# Patient Record
Sex: MALE | Race: WHITE | NOT HISPANIC OR LATINO | URBAN - METROPOLITAN AREA
[De-identification: names, ages, dates, MRNs, and addresses within clinical notes are randomized per-mention and may not be internally consistent; named-entity substitution may affect disease eponyms.]

---

## 2018-10-01 ENCOUNTER — INPATIENT (INPATIENT)
Facility: HOSPITAL | Age: 33
LOS: 1 days | Discharge: LEFT BEFORE DISCHARGE | DRG: 854 | End: 2018-10-03
Attending: STUDENT IN AN ORGANIZED HEALTH CARE EDUCATION/TRAINING PROGRAM | Admitting: STUDENT IN AN ORGANIZED HEALTH CARE EDUCATION/TRAINING PROGRAM
Payer: COMMERCIAL

## 2018-10-01 VITALS
DIASTOLIC BLOOD PRESSURE: 89 MMHG | RESPIRATION RATE: 16 BRPM | OXYGEN SATURATION: 96 % | SYSTOLIC BLOOD PRESSURE: 135 MMHG | HEART RATE: 123 BPM | TEMPERATURE: 99 F

## 2018-10-01 DIAGNOSIS — R63.8 OTHER SYMPTOMS AND SIGNS CONCERNING FOOD AND FLUID INTAKE: ICD-10-CM

## 2018-10-01 DIAGNOSIS — R00.0 TACHYCARDIA, UNSPECIFIED: ICD-10-CM

## 2018-10-01 DIAGNOSIS — F19.10 OTHER PSYCHOACTIVE SUBSTANCE ABUSE, UNCOMPLICATED: ICD-10-CM

## 2018-10-01 DIAGNOSIS — Z91.89 OTHER SPECIFIED PERSONAL RISK FACTORS, NOT ELSEWHERE CLASSIFIED: ICD-10-CM

## 2018-10-01 DIAGNOSIS — Z29.9 ENCOUNTER FOR PROPHYLACTIC MEASURES, UNSPECIFIED: ICD-10-CM

## 2018-10-01 DIAGNOSIS — F13.10 SEDATIVE, HYPNOTIC OR ANXIOLYTIC ABUSE, UNCOMPLICATED: ICD-10-CM

## 2018-10-01 DIAGNOSIS — E87.1 HYPO-OSMOLALITY AND HYPONATREMIA: ICD-10-CM

## 2018-10-01 DIAGNOSIS — L03.90 CELLULITIS, UNSPECIFIED: ICD-10-CM

## 2018-10-01 DIAGNOSIS — A41.9 SEPSIS, UNSPECIFIED ORGANISM: ICD-10-CM

## 2018-10-01 LAB
ALBUMIN SERPL ELPH-MCNC: 3.1 G/DL — LOW (ref 3.4–5)
ALP SERPL-CCNC: 70 U/L — SIGNIFICANT CHANGE UP (ref 40–120)
ALT FLD-CCNC: 32 U/L — SIGNIFICANT CHANGE UP (ref 12–42)
ANION GAP SERPL CALC-SCNC: 10 MMOL/L — SIGNIFICANT CHANGE UP (ref 9–16)
APTT BLD: 26 SEC — LOW (ref 27.5–36.5)
AST SERPL-CCNC: 56 U/L — HIGH (ref 15–37)
BASOPHILS NFR BLD AUTO: 0.1 % — SIGNIFICANT CHANGE UP (ref 0–2)
BILIRUB SERPL-MCNC: 0.7 MG/DL — SIGNIFICANT CHANGE UP (ref 0.2–1.2)
BUN SERPL-MCNC: 15 MG/DL — SIGNIFICANT CHANGE UP (ref 7–23)
CALCIUM SERPL-MCNC: 8.1 MG/DL — LOW (ref 8.5–10.5)
CHLORIDE SERPL-SCNC: 96 MMOL/L — SIGNIFICANT CHANGE UP (ref 96–108)
CO2 SERPL-SCNC: 22 MMOL/L — SIGNIFICANT CHANGE UP (ref 22–31)
CREAT SERPL-MCNC: 1.19 MG/DL — SIGNIFICANT CHANGE UP (ref 0.5–1.3)
CRP SERPL-MCNC: >12 MG/DL — HIGH (ref 0–0.9)
EOSINOPHIL NFR BLD AUTO: 0.2 % — SIGNIFICANT CHANGE UP (ref 0–6)
ERYTHROCYTE [SEDIMENTATION RATE] IN BLOOD: 42 MM/HR — HIGH (ref 0–15)
GLUCOSE SERPL-MCNC: 115 MG/DL — HIGH (ref 70–99)
HCT VFR BLD CALC: 35.5 % — LOW (ref 39–50)
HGB BLD-MCNC: 12.4 G/DL — LOW (ref 13–17)
IMM GRANULOCYTES NFR BLD AUTO: 0.5 % — SIGNIFICANT CHANGE UP (ref 0–1.5)
INR BLD: 1.42 — HIGH (ref 0.88–1.16)
LACTATE SERPL-SCNC: 1.1 MMOL/L — SIGNIFICANT CHANGE UP (ref 0.4–2)
LYMPHOCYTES # BLD AUTO: 8.5 % — LOW (ref 13–44)
MCHC RBC-ENTMCNC: 28.3 PG — SIGNIFICANT CHANGE UP (ref 27–34)
MCHC RBC-ENTMCNC: 34.9 G/DL — SIGNIFICANT CHANGE UP (ref 32–36)
MCV RBC AUTO: 81.1 FL — SIGNIFICANT CHANGE UP (ref 80–100)
MONOCYTES NFR BLD AUTO: 6.6 % — SIGNIFICANT CHANGE UP (ref 2–14)
NEUTROPHILS NFR BLD AUTO: 84.1 % — HIGH (ref 43–77)
PLATELET # BLD AUTO: 288 K/UL — SIGNIFICANT CHANGE UP (ref 150–400)
POTASSIUM SERPL-MCNC: 5 MMOL/L — SIGNIFICANT CHANGE UP (ref 3.5–5.3)
POTASSIUM SERPL-SCNC: 5 MMOL/L — SIGNIFICANT CHANGE UP (ref 3.5–5.3)
PROT SERPL-MCNC: 7.5 G/DL — SIGNIFICANT CHANGE UP (ref 6.4–8.2)
PROTHROM AB SERPL-ACNC: 15.7 SEC — HIGH (ref 9.8–12.7)
RBC # BLD: 4.38 M/UL — SIGNIFICANT CHANGE UP (ref 4.2–5.8)
RBC # FLD: 11.4 % — SIGNIFICANT CHANGE UP (ref 10.3–14.5)
SODIUM SERPL-SCNC: 128 MMOL/L — LOW (ref 132–145)
WBC # BLD: 16.6 K/UL — HIGH (ref 3.8–10.5)
WBC # FLD AUTO: 16.6 K/UL — HIGH (ref 3.8–10.5)

## 2018-10-01 PROCEDURE — 73090 X-RAY EXAM OF FOREARM: CPT | Mod: 26,RT

## 2018-10-01 PROCEDURE — 99285 EMERGENCY DEPT VISIT HI MDM: CPT

## 2018-10-01 PROCEDURE — 73201 CT UPPER EXTREMITY W/DYE: CPT | Mod: 26,RT

## 2018-10-01 PROCEDURE — 99223 1ST HOSP IP/OBS HIGH 75: CPT | Mod: GC

## 2018-10-01 RX ORDER — LIDOCAINE HCL 20 MG/ML
10 VIAL (ML) INJECTION ONCE
Qty: 0 | Refills: 0 | Status: COMPLETED | OUTPATIENT
Start: 2018-10-01 | End: 2018-10-01

## 2018-10-01 RX ORDER — OXYCODONE AND ACETAMINOPHEN 5; 325 MG/1; MG/1
1 TABLET ORAL EVERY 6 HOURS
Qty: 0 | Refills: 0 | Status: DISCONTINUED | OUTPATIENT
Start: 2018-10-01 | End: 2018-10-02

## 2018-10-01 RX ORDER — VANCOMYCIN HCL 1 G
VIAL (EA) INTRAVENOUS
Qty: 0 | Refills: 0 | Status: DISCONTINUED | OUTPATIENT
Start: 2018-10-01 | End: 2018-10-01

## 2018-10-01 RX ORDER — PIPERACILLIN AND TAZOBACTAM 4; .5 G/20ML; G/20ML
3.38 INJECTION, POWDER, LYOPHILIZED, FOR SOLUTION INTRAVENOUS ONCE
Qty: 0 | Refills: 0 | Status: COMPLETED | OUTPATIENT
Start: 2018-10-01 | End: 2018-10-01

## 2018-10-01 RX ORDER — ACETAMINOPHEN 500 MG
650 TABLET ORAL EVERY 6 HOURS
Qty: 0 | Refills: 0 | Status: DISCONTINUED | OUTPATIENT
Start: 2018-10-01 | End: 2018-10-03

## 2018-10-01 RX ORDER — HYDROMORPHONE HYDROCHLORIDE 2 MG/ML
1 INJECTION INTRAMUSCULAR; INTRAVENOUS; SUBCUTANEOUS ONCE
Qty: 0 | Refills: 0 | Status: DISCONTINUED | OUTPATIENT
Start: 2018-10-01 | End: 2018-10-01

## 2018-10-01 RX ORDER — KETOROLAC TROMETHAMINE 30 MG/ML
30 SYRINGE (ML) INJECTION ONCE
Qty: 0 | Refills: 0 | Status: DISCONTINUED | OUTPATIENT
Start: 2018-10-01 | End: 2018-10-01

## 2018-10-01 RX ORDER — SODIUM CHLORIDE 9 MG/ML
1000 INJECTION INTRAMUSCULAR; INTRAVENOUS; SUBCUTANEOUS ONCE
Qty: 0 | Refills: 0 | Status: COMPLETED | OUTPATIENT
Start: 2018-10-01 | End: 2018-10-01

## 2018-10-01 RX ORDER — SODIUM CHLORIDE 9 MG/ML
3 INJECTION INTRAMUSCULAR; INTRAVENOUS; SUBCUTANEOUS ONCE
Qty: 0 | Refills: 0 | Status: COMPLETED | OUTPATIENT
Start: 2018-10-01 | End: 2018-10-01

## 2018-10-01 RX ORDER — VANCOMYCIN HCL 1 G
1000 VIAL (EA) INTRAVENOUS EVERY 8 HOURS
Qty: 0 | Refills: 0 | Status: DISCONTINUED | OUTPATIENT
Start: 2018-10-01 | End: 2018-10-03

## 2018-10-01 RX ORDER — SODIUM CHLORIDE 9 MG/ML
2750 INJECTION INTRAMUSCULAR; INTRAVENOUS; SUBCUTANEOUS ONCE
Qty: 0 | Refills: 0 | Status: COMPLETED | OUTPATIENT
Start: 2018-10-01 | End: 2018-10-01

## 2018-10-01 RX ORDER — VANCOMYCIN HCL 1 G
1250 VIAL (EA) INTRAVENOUS ONCE
Qty: 0 | Refills: 0 | Status: COMPLETED | OUTPATIENT
Start: 2018-10-01 | End: 2018-10-01

## 2018-10-01 RX ADMIN — HYDROMORPHONE HYDROCHLORIDE 1 MILLIGRAM(S): 2 INJECTION INTRAMUSCULAR; INTRAVENOUS; SUBCUTANEOUS at 19:20

## 2018-10-01 RX ADMIN — SODIUM CHLORIDE 2750 MILLILITER(S): 9 INJECTION INTRAMUSCULAR; INTRAVENOUS; SUBCUTANEOUS at 13:22

## 2018-10-01 RX ADMIN — PIPERACILLIN AND TAZOBACTAM 200 GRAM(S): 4; .5 INJECTION, POWDER, LYOPHILIZED, FOR SOLUTION INTRAVENOUS at 09:33

## 2018-10-01 RX ADMIN — Medication 166.67 MILLIGRAM(S): at 10:45

## 2018-10-01 RX ADMIN — SODIUM CHLORIDE 1000 MILLILITER(S): 9 INJECTION INTRAMUSCULAR; INTRAVENOUS; SUBCUTANEOUS at 13:22

## 2018-10-01 RX ADMIN — Medication 30 MILLIGRAM(S): at 11:26

## 2018-10-01 RX ADMIN — SODIUM CHLORIDE 1000 MILLILITER(S): 9 INJECTION INTRAMUSCULAR; INTRAVENOUS; SUBCUTANEOUS at 09:21

## 2018-10-01 RX ADMIN — SODIUM CHLORIDE 1375 MILLILITER(S): 9 INJECTION INTRAMUSCULAR; INTRAVENOUS; SUBCUTANEOUS at 09:22

## 2018-10-01 RX ADMIN — SODIUM CHLORIDE 3 MILLILITER(S): 9 INJECTION INTRAMUSCULAR; INTRAVENOUS; SUBCUTANEOUS at 10:45

## 2018-10-01 RX ADMIN — Medication 1250 MILLIGRAM(S): at 10:30

## 2018-10-01 RX ADMIN — Medication 10 MILLILITER(S): at 13:21

## 2018-10-01 RX ADMIN — HYDROMORPHONE HYDROCHLORIDE 1 MILLIGRAM(S): 2 INJECTION INTRAMUSCULAR; INTRAVENOUS; SUBCUTANEOUS at 11:26

## 2018-10-01 RX ADMIN — PIPERACILLIN AND TAZOBACTAM 3.38 GRAM(S): 4; .5 INJECTION, POWDER, LYOPHILIZED, FOR SOLUTION INTRAVENOUS at 10:30

## 2018-10-01 RX ADMIN — HYDROMORPHONE HYDROCHLORIDE 1 MILLIGRAM(S): 2 INJECTION INTRAMUSCULAR; INTRAVENOUS; SUBCUTANEOUS at 13:21

## 2018-10-01 RX ADMIN — Medication 250 MILLIGRAM(S): at 23:13

## 2018-10-01 RX ADMIN — Medication 30 MILLIGRAM(S): at 13:21

## 2018-10-01 NOTE — H&P ADULT - ASSESSMENT
33M with a history of IVDA (heroin, cocaine) admitted with sepsis 2/2 R forearm abscess + cellulitis

## 2018-10-01 NOTE — H&P ADULT - NSHPLABSRESULTS_GEN_ALL_CORE
12.4   16.6  )-----------( 288      ( 01 Oct 2018 09:07 )             35.5       LIVER FUNCTIONS - ( 01 Oct 2018 09:07 )  Alb: 3.1 g/dL / Pro: 7.5 g/dL / ALK PHOS: 70 U/L / ALT: 32 U/L / AST: 56 U/L / GGT: x             All imaging reviewed

## 2018-10-01 NOTE — H&P ADULT - PROBLEM SELECTOR PLAN 6
F: none  E: replete PRN  N: regular diet -f/u utox and etoh  -consider methadone, clonidine for mgmt of opiate withdrawal  -monitor closely for etoh w/d  -HIV, HCV screens  -TTE   -SW c/s -advised cessation   -f/u utox and etoh  -consider methadone, clonidine for mgmt of opiate withdrawal  -monitor closely for BZD w/d  -HIV, HCV screens  -TTE   -SW c/s

## 2018-10-01 NOTE — H&P ADULT - ATTENDING COMMENTS
patient seen and examined; pt w/ right forearm abscess s/p I&D along w/ cellulitis in setting of IV drug use; pt reports taking klonopin bought off street, does not know the dose, uses 8 tabs daily for the last 30 days     reviewed vs, relevant data points: labs, available radiological reports/ studies     agree w/ PE findings as above, in addition pt had CIWA of 5 w/ mild anxiety, tongue fasciculations, mild hand tremors b/l, diaphoresis    1. sepsis; cellulitis/ abscess: on IV vancomycin, follow up ctxs; monitor for worsening signs, new cellulitis on other extremities; ordered ECHO   2. monitor for opioid withdrawl, on librium given klonopin use, monitor for BZD withdrawl; monitor for cocaine withdrawl         rest of plan as above

## 2018-10-01 NOTE — H&P ADULT - PROBLEM SELECTOR PLAN 5
-f/u utox and etoh  -consider methadone, clonidine for mgmt of opiate withdrawal  -monitor closely for etoh w/d  -HIV, HCV screens  -TTE   -SW c/s -Resolved.  initially. 2/2 sepsis +/- cocaine. f/u utox. mgmt of sepsis as above (#1).  -no e/o PE or thyrotoxicosis

## 2018-10-01 NOTE — ED ADULT NURSE NOTE - NS ED NURSE REPORT GIVEN TO FT
Agnes Saravia   2/6/2017 8:45 AM   Anticoagulation Therapy Visit    Description:  71 year old female   Provider:   ANTICOAGULATION CLINIC   Department:   Nurse           INR as of 2/6/2017     Selected INR 1.6! (2/6/2017)      Anticoagulation Summary as of 2/6/2017     INR goal 2.0-3.0   Selected INR 1.6! (2/6/2017)   Full instructions 2/6: 5 mg; 2/7: 4 mg; Otherwise 4 mg on Wed, Sat; 3 mg all other days   Next INR check 2/9/2017    Indications   Long-term (current) use of anticoagulants [Z79.01] [Z79.01]  Pulmonary embolism (H) (Resolved) [I26.99]         Your next Anticoagulation Clinic appointment(s)     Feb 09, 2017  8:45 AM   Anticoagulation Visit with  ANTICOAGULATION CLINIC   [facility] [place] ([facility])    [address]  [place] [address]   [phone]              Contact Numbers     Clinic Number:         February 2017 Details    Sun Mon Tue Wed Thu Fri Sat        1               2               3               4                 5               6      5 mg   See details      7      4 mg         8      4 mg         9            10               11                 12               13               14               15               16               17               18                 19               20               21               22               23               24               25                 26               27               28                    Date Details   02/06 This INR check       Date of next INR:  2/9/2017         How to take your warfarin dose     To take:  3 mg Take 3 of the 1 mg tablets.    To take:  4 mg Take 4 of the 1 mg tablets.    To take:  5 mg Take 5 of the 1 mg tablets.            Megan RN 4 [address]

## 2018-10-01 NOTE — ED PROVIDER NOTE - NSFOLLOWUPINSTRUCTIONS_ED_ALL_ED_FT
Follow up with spine, Dr. Chang at[address] [place] [address] 5th floor.  Call for follow up appointment

## 2018-10-01 NOTE — ED PROVIDER NOTE - MEDICAL DECISION MAKING DETAILS
34 y/o M presents to ED with cellulitis to bilat arms, abscess to R hand and forearm and R medial ankle.  + cellulitis with abscess.  Pt triggered SIRS criteria upon arrival with temp and tachycardia 120's.  + elevatd WBC.  normal lactate.  Abscess drained by Cipollone.  Pt admitted to [facility] medicine.

## 2018-10-01 NOTE — H&P ADULT - HISTORY OF PRESENT ILLNESS
Pt is a 33M with a history of IVDA (heroin, cocaine) who presents with R arm pain and swelling.    In the ED, T 99.1, , /89, RR 16, 96% on RA. He spiked a fever of 102.3 (rectally) which defervesced. HR improved to 60s-80s with IVF. CBC showed WBC 16.6 (neutrophil predominant), normal lactate, Na+ 128, INR 1.42, CRP >12, ESR 42. XR forearm revealed no foreign bodies. CT R forearm showed a 3.8x3x1.3 cm abscess in the lateral R forearm with extensive cellulitis but no e/o osteo. Plastic surgery performed beside I&D. He received vanc, zosyn, dilaudid (1 mg IV x 2), toradol 30 IVP and NS bolus 3.75L. Pt is a 33M with a history of IVDA (heroin, cocaine) and klonopin abuse who presents with R arm pain and swelling x several days. He began using IV heroin/cocaine again 3 weeks ago after a long period of abstinence (maintained on suboxone). Noticed redness/pain/swelling along bilateral forearms at sites of injections. R arm more painful than L. Most painful site was distal lateral R forearm. Pain is sharp and sore. No joint pain or swelling, but does report difficulty in moving wrist and fingers in R hand (limited 2/2 stiffness, not pain). No numbness/tingling in hands. Questionable chills. No hx MRSA or IE. Had cellulitis of a foot (doesnt know laterality) three years ago and was treated with IV abx for 2-3 days with resolution. Denies CP, palp, HA, neck stiffness, sore throat, cough, SOB, abd pain, N/V/D/C, dysuria, edema. Says he uses $400 worth of drugs daily. Cannot identify trigger for relapse. Uses ~8 klonopin daily. He has gone through detox for benzo w/d, but never req ICU or intubation. No hx seizures or DTs. Denies etoh use. Denies nasal use of drugs. Last drug use 12 hours ago. Denies anx/dep/SI/HI/AH/VH. Otherwise, ROS negative.     In the ED, T 99.1, , /89, RR 16, 96% on RA. He spiked a fever of 102.3 (rectally) which defervesced. HR improved to 60s-80s with IVF. CBC showed WBC 16.6 (neutrophil predominant), normal lactate, Na+ 128, INR 1.42, CRP >12, ESR 42. XR forearm revealed no foreign bodies. CT R forearm showed a 3.8x3x1.3 cm abscess in the lateral R forearm with extensive cellulitis but no e/o osteo. Plastic surgery performed beside I&D. He received vanc, zosyn, dilaudid (1 mg IV x 2), toradol 30 IVP and NS bolus 3.75L.    No Family hx of CAD in father.

## 2018-10-01 NOTE — H&P ADULT - PROBLEM SELECTOR PLAN 7
DVT: No pharmacologic DVT ppx indicated. Encourage ambulation.  GI: no ppx  Full code  RMF F: none  E: replete PRN  N: regular diet

## 2018-10-01 NOTE — ED PROVIDER NOTE - CONDUCTED A DETAILED DISCUSSION WITH PATIENT AND/OR GUARDIAN REGARDING, MDM
return to ED if symptoms worsen, persist or questions arise/radiology results/lab results/need to admit

## 2018-10-01 NOTE — H&P ADULT - PROBLEM SELECTOR PLAN 9
1) PCP Contacted On Admission: (Yes)    Name, Phone #  2) Date of Contact with PCP:  3) PCP Contacted at Discharge: (Y/N)  4) Summary of Handoff Given to PCP:   5) Post Discharge Appointment Date and Location: 1) PCP Contacted On Admission: (NO)    Name, Phone # NO PCP   2) Date of Contact with PCP:  3) PCP Contacted at Discharge: (Y/N)  4) Summary of Handoff Given to PCP:   5) Post Discharge Appointment Date and Location:

## 2018-10-01 NOTE — ED PROVIDER NOTE - CARE PROVIDER_API CALL
Merced Chang), Orthopaedic Surgery  [address]  [address]th [place] [address]  Phone: [phone]  Fax: [phone]

## 2018-10-01 NOTE — H&P ADULT - NSHPPHYSICALEXAM_GEN_ALL_CORE
General:  NAD, nontoxic appearing, overweight  HENT:  EOMI, PERRL.  No sinus tenderness.  oropharynx WNL.  MMM  Neck:  Trachea midline.  No JVD, LAD, or thyromegaly.  Heart:  S1S2 no M/R/G, rrr  Lungs:  CTAB no wheezing, rhonchi or rales.  No accessory muscle use.  No respiratory distress.  Abdomen:  NABS.  soft, nontender, nondistended.  no guarding.  no ascites.  no organomegaly.  Vascular:  Peripheral pulses palpable  RUE: erythema/warmth extending from dorsal wrist to proximal forearm, noncircumferential, primarily dorsal. Dressing CDI. full PROM of all joints. AROM limited in wrist and fingers 2/2 stiffness. Cap refill WNL. Senstion intact throughout. No fluctuance. Minimal drainage from site of I&D. No joint swelling.  LUE: patchy erythema/warmth and tenderness along forarm from elbow to wrist. Multiple small abrasions. No palpable fluid collections. no fluctuance/crepitus. Full AROM in all joints. Cap refill WNL. No joint swelling.   LLE: WNL  RLE: ~[id number] area of erthema above medial malleolus. No palpable fluid collection of crepitus/fluctuance. No drainage/wound. Mild ttp. No joint swelling. full ROM in ankle. Distal pulses 2+. Sensation intact throughout.   Back:  No CVA tenderness  Neuro:  AOx3, no facial asymmetry, nonfocal, no slurred speech  Psych: flat affect, slightly diaphoretic, no tremor. Normal responses.   Skin:  No rash General:  NAD, nontoxic appearing, overweight  HENT:  EOMI, PERRL.  No sinus tenderness.  oropharynx WNL.  MMM  Neck:  Trachea midline.  No JVD, LAD, or thyromegaly.  Heart:  S1S2 no M/R/G, rrr  Lungs:  CTAB no wheezing, rhonchi or rales.  No accessory muscle use.  No respiratory distress.  Abdomen:  NABS.  soft, nontender, nondistended.  no guarding.  no ascites.  no organomegaly.  Vascular:  Peripheral pulses palpable  RUE: erythema/warmth extending from dorsal wrist to proximal forearm, noncircumferential, primarily dorsal. Dressing CDI. full PROM of all joints. AROM limited in wrist and fingers 2/2 stiffness. Cap refill WNL. Sensation intact throughout. No fluctuance. Minimal drainage from site of I&D. No joint swelling.  LUE: patchy erythema/warmth and tenderness along forearm from elbow to wrist. Multiple small abrasions. No palpable fluid collections. no fluctuance/crepitus. Full AROM in all joints. Cap refill WNL. No joint swelling.   LLE: WNL  RLE: ~46hva4cv area of erthema above medial malleolus. No palpable fluid collection of crepitus/fluctuance. No drainage/wound. Mild ttp. No joint swelling. full ROM in ankle. Distal pulses 2+. Sensation intact throughout.   Back:  No CVA tenderness  Neuro:  AOx3, no facial asymmetry, nonfocal, no slurred speech  Psych: flat affect, slightly diaphoretic, no tremor. Normal responses.   Skin:  No rash

## 2018-10-01 NOTE — H&P ADULT - PROBLEM SELECTOR PLAN 4
-Resolved.  initially. 2/2 sepsis +/- cocaine. f/u utox. mgmt of sepsis as above (#1).  -no e/o PE or thyrotoxicosis -Na+ 128. Unclear etiology. f/u urine lytes.  -Repeat BMP (goal correction- max 6 meq over 24 hours

## 2018-10-01 NOTE — CONSULT NOTE ADULT - SUBJECTIVE AND OBJECTIVE BOX
32 yo R hand dominant M h/o IV drug use with recent relapse after not using drugs for ten years.  Patient states that he has been injecting heroin and cocaine into his B upper extremities over the last 3 days and has developed RUE erythema and edema since that time. HIV/Hep B negative per patient. Febrile 102F, normotensive. WBC 16K. CT scan RUE reviewed with attending radiologist: + superficial distal forearm abscess over radial aspect. No other collections. Patient given vanc/zosyn in ER and endorses decreased erythema and pain.    PMH: IVDU   All: NKDA     PE: R UE with improving cellulitis relative to the ink markings over the proximal aspect of R arm;   no crepitus; +fluctuant area over R distal forearm radially; R dorsal hand with 2X2 cm draining collection; R dorso-ulnar aspect wrist with 1X1 cm draining abscess;  NV intact; +circumferential edema/erythema with soft compartments  L UE with injection marks, cellulitis and no guillaume collections.    CT scan: as above; localized, superficial collection over radial forearm    Procedure:   1. Informed consent was obtained. R/B/A discussed with patient who expressed understanding and opted to proceed. He understands that he may proceed to further surgical intervention.  2. Skin prepped with betadine and 3 field blocks with 1% lidocaine with epinephrine were performed over the R mid-radial forearm, proximal R hand and proximal to the R ulnar styloid.  3. I+D performed R radial, mid-distal forearm with 15 blade and opened to subcutaneous tissue. +++purulent drainage expressed and sent for culture.  4. I+D performed R proximal hand with 15 blade and opened to subcutaneous tissue. Minimal purulent drainage.  5. I+D performed R dorso-ulnar distal forearm and opened to subcutaneous tissue. No drainage expressed.  6. All 3 wounds irrigated with copious amounts of sterile saline. Wounds hemostatic post-procedure and packed with 1/4" packing strip and sterile guaze.  7. Patient tolerated procedure well.    A+P: R forearm abscess s/p I+D; +forearm cellulitis B    1. s/p I+D R forearm abscess. Rec: local wound care with plain 1/4" packing strip Q 4-6 hours. Warm soaks R hand Q4 hours with saline/peroxide X 20 min/soak.  Strict elevation R UE at all times.  2. ID consult  3. Admit to medicine for IV antibiotics; patient last used heroin today  4. Appreciate consult. Please call [phone] if patient condition worsens.  5. Discussed with ED team at .

## 2018-10-01 NOTE — ED PROVIDER NOTE - SKIN TEMP
marked erythema with multiple abscess to R hand and R lower arm excluding digits and proximal 1/2 of upper arm.  + serous and exudative drainage./warm marked erythema with multiple abscess to R hand and R lower arm excluding digits and proximal 1/2 of upper arm.  + serous and exudative drainage.  L arm is also erythematous to elbow.  9 cm x 10 cm erythema to medial ankle.  no crepitus or areas of necrosis./warm

## 2018-10-01 NOTE — H&P ADULT - PROBLEM SELECTOR PLAN 1
-Presenting with fever, tachycardia and leukocytosis. Lactate WNL. Source: abscess + cellulitis, s/p bedside I&D. VSS now after abx and IVF. Adequately fluid resuscitated.  -F/u blood cx, abscess cx  -Tylenol PRN fever  -c/w vanc  -TTE

## 2018-10-01 NOTE — ED ADULT NURSE NOTE - NSIMPLEMENTINTERV_GEN_ALL_ED
Implemented All Fall Risk Interventions:  [place] to call system. Call bell, personal items and telephone within reach. Instruct patient to call for assistance. Room bathroom lighting operational. Non-slip footwear when patient is off stretcher. Physically safe environment: no spills, clutter or unnecessary equipment. Stretcher in lowest position, wheels locked, appropriate side rails in place. Provide visual cue, wrist band, yellow gown, etc. Monitor gait and stability. Monitor for mental status changes and reorient to person, place, and time. Review medications for side effects contributing to fall risk. Reinforce activity limits and safety measures with patient and family.

## 2018-10-01 NOTE — ED PROVIDER NOTE - PROGRESS NOTE DETAILS
Pt discussed with hand on call.  She will see pt in ED for bs I&D.  CT arm requested. Pt seen by Dr. Miesha Pena, hand on call.  BS I&D done.  Pt now able to be admitted for cellulitis for further management of cellulitis.  [facility] transfer center called for admission, medicine.  Awaiting call back.

## 2018-10-01 NOTE — H&P ADULT - PROBLEM SELECTOR PLAN 3
-Na+ 128. Unclear etiology. f/u urine lytes.  -Repeat BMP (goal correction- max 6 meq over 24 hours -Reports taking eight Klonopin daily x 3 weeks. Unknown dose. Last used 12 hours ago. No hx seizure or intubations, but multiple detox for benzo w/d  -Current CIWA: 3. Will monitor CIWA throughout night and use CIWA protocol. Will consult ICU for any CIWA >14 or for uncontrolled sx.  -c/w librium -Reports taking eight Klonopin daily x 3 weeks. Unknown dose. Last used 12 hours ago. No hx seizure or intubations, but multiple detox for benzo w/d  -Current CIWA: 3. Will monitor CIWA throughout night and use CIWA protocol. Will consult ICU for any CIWA >14 or for uncontrolled sx.  -c/w librium 50Q8  -ativan 2mg IVP Q2H PRN CIWA>7

## 2018-10-01 NOTE — ED PROVIDER NOTE - OBJECTIVE STATEMENT
32 y/o M, denies PMH presents to ED with cellulitis.  Pt admits he relapsed on IV heroin/cocaine use after 3 years of sobriety.  Pt noted redness and swelling to R hand 2-3 days ago.  The redness now extends to his entire R arm.  He denies fevers/chills, n/v/d.  Pt denies history of MRSA. Pt lives at home with wife.

## 2018-10-01 NOTE — H&P ADULT - NSHPSOCIALHISTORY_GEN_ALL_CORE
tobacco-   etoh-   drugs-  occupation-  lives with wife  ambulatory without assistance tobacco- denies  etoh- denies  drugs- heroin, cocaine, klonopin  occupation- "patient visitor at psychiatric facilities"  lives with wife  ambulatory without assistance

## 2018-10-01 NOTE — H&P ADULT - FAMILY HISTORY
No pertinent family history in first degree relatives Father  Still living? Unknown  No family history of cardiovascular disease, Age at diagnosis: Age Unknown     Mother  Still living? Unknown  No family history of cardiovascular disease, Age at diagnosis: Age Unknown

## 2018-10-01 NOTE — H&P ADULT - PROBLEM SELECTOR PLAN 2
-s/p bedside I&D R forearm abscess. No radiologic e/o osteo.  -C/w vanc to cover for MRSA   -f/u blood and abscess cx  -warm compresses  -Tylenol PRN fever  -tylenol for moderate pain, percocet for severe pain  -TTE

## 2018-10-02 VITALS
TEMPERATURE: 98 F | OXYGEN SATURATION: 97 % | RESPIRATION RATE: 20 BRPM | SYSTOLIC BLOOD PRESSURE: 119 MMHG | HEART RATE: 75 BPM | DIASTOLIC BLOOD PRESSURE: 76 MMHG

## 2018-10-02 LAB
ALBUMIN SERPL ELPH-MCNC: 3.2 G/DL — LOW (ref 3.3–5)
ALP SERPL-CCNC: 57 U/L — SIGNIFICANT CHANGE UP (ref 40–120)
ALT FLD-CCNC: 27 U/L — SIGNIFICANT CHANGE UP (ref 10–45)
ANION GAP SERPL CALC-SCNC: 12 MMOL/L — SIGNIFICANT CHANGE UP (ref 5–17)
AST SERPL-CCNC: 26 U/L — SIGNIFICANT CHANGE UP (ref 10–40)
BILIRUB SERPL-MCNC: 0.4 MG/DL — SIGNIFICANT CHANGE UP (ref 0.2–1.2)
BUN SERPL-MCNC: 7 MG/DL — SIGNIFICANT CHANGE UP (ref 7–23)
CALCIUM SERPL-MCNC: 8.4 MG/DL — SIGNIFICANT CHANGE UP (ref 8.4–10.5)
CHLORIDE SERPL-SCNC: 100 MMOL/L — SIGNIFICANT CHANGE UP (ref 96–108)
CO2 SERPL-SCNC: 25 MMOL/L — SIGNIFICANT CHANGE UP (ref 22–31)
CREAT SERPL-MCNC: 0.99 MG/DL — SIGNIFICANT CHANGE UP (ref 0.5–1.3)
ETHANOL SERPL-MCNC: <10 MG/DL — SIGNIFICANT CHANGE UP (ref 0–10)
GLUCOSE SERPL-MCNC: 125 MG/DL — HIGH (ref 70–99)
HBA1C BLD-MCNC: 5.2 % — SIGNIFICANT CHANGE UP (ref 4–5.6)
HCT VFR BLD CALC: 31.4 % — LOW (ref 39–50)
HCV AB S/CO SERPL IA: 0.05 S/CO — SIGNIFICANT CHANGE UP
HCV AB SERPL-IMP: SIGNIFICANT CHANGE UP
HGB BLD-MCNC: 10.5 G/DL — LOW (ref 13–17)
HIV 1+2 AB+HIV1 P24 AG SERPL QL IA: SIGNIFICANT CHANGE UP
INR BLD: 1.44 — HIGH (ref 0.88–1.16)
MAGNESIUM SERPL-MCNC: 2.5 MG/DL — SIGNIFICANT CHANGE UP (ref 1.6–2.6)
MCHC RBC-ENTMCNC: 27.5 PG — SIGNIFICANT CHANGE UP (ref 27–34)
MCHC RBC-ENTMCNC: 33.4 G/DL — SIGNIFICANT CHANGE UP (ref 32–36)
MCV RBC AUTO: 82.2 FL — SIGNIFICANT CHANGE UP (ref 80–100)
OSMOLALITY UR: 156 MOSMOL/KG — SIGNIFICANT CHANGE UP (ref 100–650)
PCP SPEC-MCNC: SIGNIFICANT CHANGE UP
PLATELET # BLD AUTO: 216 K/UL — SIGNIFICANT CHANGE UP (ref 150–400)
POTASSIUM SERPL-MCNC: 3.4 MMOL/L — LOW (ref 3.5–5.3)
POTASSIUM SERPL-SCNC: 3.4 MMOL/L — LOW (ref 3.5–5.3)
PROT SERPL-MCNC: 6.4 G/DL — SIGNIFICANT CHANGE UP (ref 6–8.3)
PROTHROM AB SERPL-ACNC: 16.1 SEC — HIGH (ref 9.8–12.7)
RBC # BLD: 3.82 M/UL — LOW (ref 4.2–5.8)
RBC # FLD: 11.8 % — SIGNIFICANT CHANGE UP (ref 10.3–16.9)
SODIUM SERPL-SCNC: 137 MMOL/L — SIGNIFICANT CHANGE UP (ref 135–145)
SODIUM UR-SCNC: <20 MMOL/L — SIGNIFICANT CHANGE UP
TSH SERPL-MCNC: 0.34 UIU/ML — LOW (ref 0.35–4.94)
WBC # BLD: 9.5 K/UL — SIGNIFICANT CHANGE UP (ref 3.8–10.5)
WBC # FLD AUTO: 9.5 K/UL — SIGNIFICANT CHANGE UP (ref 3.8–10.5)

## 2018-10-02 PROCEDURE — 83935 ASSAY OF URINE OSMOLALITY: CPT

## 2018-10-02 PROCEDURE — 84443 ASSAY THYROID STIM HORMONE: CPT

## 2018-10-02 PROCEDURE — 85610 PROTHROMBIN TIME: CPT

## 2018-10-02 PROCEDURE — 84300 ASSAY OF URINE SODIUM: CPT

## 2018-10-02 PROCEDURE — 87070 CULTURE OTHR SPECIMN AEROBIC: CPT

## 2018-10-02 PROCEDURE — 80053 COMPREHEN METABOLIC PANEL: CPT

## 2018-10-02 PROCEDURE — 99233 SBSQ HOSP IP/OBS HIGH 50: CPT | Mod: GC

## 2018-10-02 PROCEDURE — 83036 HEMOGLOBIN GLYCOSYLATED A1C: CPT

## 2018-10-02 PROCEDURE — 36415 COLL VENOUS BLD VENIPUNCTURE: CPT

## 2018-10-02 PROCEDURE — 85027 COMPLETE CBC AUTOMATED: CPT

## 2018-10-02 PROCEDURE — 85730 THROMBOPLASTIN TIME PARTIAL: CPT

## 2018-10-02 PROCEDURE — 73090 X-RAY EXAM OF FOREARM: CPT

## 2018-10-02 PROCEDURE — 85025 COMPLETE CBC W/AUTO DIFF WBC: CPT

## 2018-10-02 PROCEDURE — 87040 BLOOD CULTURE FOR BACTERIA: CPT

## 2018-10-02 PROCEDURE — 97161 PT EVAL LOW COMPLEX 20 MIN: CPT

## 2018-10-02 PROCEDURE — 80307 DRUG TEST PRSMV CHEM ANLYZR: CPT

## 2018-10-02 PROCEDURE — 87389 HIV-1 AG W/HIV-1&-2 AB AG IA: CPT

## 2018-10-02 PROCEDURE — 86140 C-REACTIVE PROTEIN: CPT

## 2018-10-02 PROCEDURE — 83735 ASSAY OF MAGNESIUM: CPT

## 2018-10-02 PROCEDURE — 73201 CT UPPER EXTREMITY W/DYE: CPT

## 2018-10-02 PROCEDURE — 86803 HEPATITIS C AB TEST: CPT

## 2018-10-02 PROCEDURE — 83605 ASSAY OF LACTIC ACID: CPT

## 2018-10-02 PROCEDURE — 85652 RBC SED RATE AUTOMATED: CPT

## 2018-10-02 RX ORDER — POTASSIUM CHLORIDE 20 MEQ
20 PACKET (EA) ORAL
Qty: 0 | Refills: 0 | Status: COMPLETED | OUTPATIENT
Start: 2018-10-02 | End: 2018-10-02

## 2018-10-02 RX ORDER — TRAZODONE HCL 50 MG
50 TABLET ORAL AT BEDTIME
Qty: 0 | Refills: 0 | Status: DISCONTINUED | OUTPATIENT
Start: 2018-10-02 | End: 2018-10-03

## 2018-10-02 RX ADMIN — OXYCODONE AND ACETAMINOPHEN 1 TABLET(S): 5; 325 TABLET ORAL at 07:58

## 2018-10-02 RX ADMIN — Medication 20 MILLIEQUIVALENT(S): at 10:03

## 2018-10-02 RX ADMIN — Medication 250 MILLIGRAM(S): at 13:35

## 2018-10-02 RX ADMIN — Medication 50 MILLIGRAM(S): at 06:14

## 2018-10-02 RX ADMIN — Medication 20 MILLIEQUIVALENT(S): at 10:04

## 2018-10-02 RX ADMIN — OXYCODONE AND ACETAMINOPHEN 1 TABLET(S): 5; 325 TABLET ORAL at 01:05

## 2018-10-02 RX ADMIN — Medication 20 MILLIEQUIVALENT(S): at 13:35

## 2018-10-02 RX ADMIN — OXYCODONE AND ACETAMINOPHEN 1 TABLET(S): 5; 325 TABLET ORAL at 01:35

## 2018-10-02 RX ADMIN — Medication 250 MILLIGRAM(S): at 07:28

## 2018-10-02 RX ADMIN — Medication 50 MILLIGRAM(S): at 00:55

## 2018-10-02 RX ADMIN — OXYCODONE AND ACETAMINOPHEN 1 TABLET(S): 5; 325 TABLET ORAL at 07:28

## 2018-10-02 RX ADMIN — Medication 50 MILLIGRAM(S): at 13:35

## 2018-10-02 NOTE — OCCUPATIONAL THERAPY INITIAL EVALUATION ADULT - GENERAL OBSERVATIONS, REHAB EVAL
Right hand dominant. Patient cleared for Occupational Therapy by JEN Sheffield. Patient received in NAD, seated EOB, + IV heplock, +wound dressing on right wrist. Patient reports pain at rest of 4/10 and 9/10 when in right shoulder flexion.
Right hand dominant. Patient cleared for Occupational Therapy by JEN Sheffield. Patient received in NAD, seated EOB, + IV heplock, +wound dressing on right wrist. Patient reports pain at rest of 4/10 and 9/10 when in right shoulder flexion.

## 2018-10-02 NOTE — PROGRESS NOTE ADULT - PROBLEM SELECTOR PLAN 9
1) PCP Contacted On Admission: (NO)    Name, Phone # NO PCP. Will offer follow up at [facility] prior to patient discharge.   2) Date of Contact with PCP: N/A.   3) PCP Contacted at Discharge: (Y/N)  4) Summary of Handoff Given to PCP:   5) Post Discharge Appointment Date and Location:

## 2018-10-02 NOTE — PROGRESS NOTE ADULT - SUBJECTIVE AND OBJECTIVE BOX
Patient states that he feels better today with decreased pain R upper extremity s/p I+D.  WBC normalized today. Wound culture pending.    PE: R upper extremity with improving erythema; I+D sites X 3 open with no drainage;   soft compartments; LUE with improving erythema and no evidence of abscess on exam    A+P: IVDU s/p I+D X 3 R upper extremity abscesses    -patient's exam improving, afebrile with normalized WBC  -continue IV vancomycin; consider ID consult/broadening IV antibiotic coverage until wound cultures resulted  -warm soaks R UE with peroxide Q shift x 20 minutes, then re-pack 3 wounds with 1/4" plain packing strip  -elevation R UE at all times  -please call [phone] with worsening sx

## 2018-10-02 NOTE — OCCUPATIONAL THERAPY INITIAL EVALUATION ADULT - NS ASR FOLLOW COMMAND OT EVAL
100% of the time/able to follow multistep instructions
100% of the time/able to follow multistep instructions

## 2018-10-02 NOTE — PROGRESS NOTE ADULT - PROBLEM SELECTOR PLAN 2
CT R arm showed 3.8x3x1.3 cm abscess  -s/p bedside I&D R forearm abscess. No radiologic e/o osteo.  -C/w vanc to cover for MRSA   -f/u blood and abscess cx  -warm compresses  -Tylenol PRN fever/pain  - avoiding Percocet due to drug abuse history

## 2018-10-02 NOTE — OCCUPATIONAL THERAPY INITIAL EVALUATION ADULT - COORDINATION ASSESSED, REHAB EVAL
Dysmetria observed in Bilateral upper extremities/finger to nose finger to nose/Dysmetria observed in Bilateral upper extremities

## 2018-10-02 NOTE — PROGRESS NOTE ADULT - ATTENDING COMMENTS
Pt seen and examined by me at bedside earlier in AM. Agree with housestaff's exam/a/p as noted above with additions,   VSS, exam as above  +S1/S2 no murmur  RUE-dressing wrapped, s/p I&D  L elbow-?flatulence on L medial elbow, pain on palpation  RLE-erythema. no flatulence  labs reviewed  bcx: NGTD    a/p:  1. sepsis poa 2/2 cellulitis/abscess-c/w vanco IV for now, f/u wound cx, check L elbow u/s to r/o abscess  rest of a/p as above.

## 2018-10-02 NOTE — OCCUPATIONAL THERAPY INITIAL EVALUATION ADULT - RANGE OF MOTION EXAMINATION
deficits as listed below/Bilateral upper extremities
deficits as listed below/Right Upper Extremity presents with limitations in elbow flexion/extension, elbow with minimal AROM, wrist with minimal AROM, composite digits flexion/extension very little AROM 2/2 pain and stiffness. Left Upper Extremity WFL shoulder flexion, limitations observed in elbow extension/flexion, wrist ulnar/radial deviation, flexion/extension

## 2018-10-02 NOTE — PROGRESS NOTE ADULT - ASSESSMENT
33M with a history of IVDA (heroin, cocaine) admitted with sepsis 2/2 R forearm abscess + multifocal cellulitis at sites of IV drug injection.

## 2018-10-02 NOTE — PROGRESS NOTE ADULT - PROBLEM SELECTOR PLAN 6
-advised cessation   -EtOH negative, Benzo/opiate/cocaine positive  -consider methadone, clonidine for mgmt of opiate withdrawal  -monitor closely for BZD w/d  -HIV, HCV negative  - c/s

## 2018-10-02 NOTE — PROGRESS NOTE ADULT - PROBLEM SELECTOR PLAN 1
Presenting with fever, tachycardia and leukocytosis. Lactate WNL. Source: abscess + cellulitis of R and L upper extremities and RLE; s/p bedside I&D of RUE. VSS now after abx and IVF. Adequately fluid resuscitated.  -F/u blood cx, abscess cx  -Tylenol PRN fever  -c/w vancomycin pending abscess and blood cultures

## 2018-10-02 NOTE — OCCUPATIONAL THERAPY INITIAL EVALUATION ADULT - PLANNED THERAPY INTERVENTIONS, OT EVAL
IADL retraining/strengthening/stretching/cognitive, visual perceptual/balance training/fine motor coordination training/ADL retraining/ROM

## 2018-10-02 NOTE — PROGRESS NOTE ADULT - SUBJECTIVE AND OBJECTIVE BOX
OVERNIGHT EVENTS: No acute event overnight.     SUBJECTIVE / INTERVAL HPI: Patient seen and examined at bedside. His pain around his R and L arms is improving, though he continues to have pain on flexing his L elbow and pain when walking on his R foot; though is able to ambulate to the bathroom. He denies fever, chills, n/v/d, SOB, anxiety, hallucinations, flushing.     CIWAs:  10/2 midnight 0  3:00 AM 0  4:30 AM 5  8:00 AM 0    VITAL SIGNS:  Vital Signs Last 24 Hrs  T(C): 36.4 (02 Oct 2018 08:53), Max: 37.7 (01 Oct 2018 20:18)  T(F): 97.6 (02 Oct 2018 08:53), Max: 99.8 (01 Oct 2018 20:18)  HR: 77 (02 Oct 2018 08:53) (67 - 88)  BP: 105/62 (02 Oct 2018 08:53) (105/62 - 152/79)  BP(mean): --  RR: 20 (02 Oct 2018 08:53) (16 - 20)  SpO2: 96% (02 Oct 2018 08:53) (96% - 100%)    PHYSICAL EXAM:    General: WDWN.   HEENT: NC/AT; PERRL, anicteric sclera; MMM  Cardiovascular: +S1/S2, RRR. +2/6 systolic murmur best heard at LLSB.   Respiratory: CTA B/L; no W/R/R  Gastrointestinal: soft, NT/ND  RUE: 2+ edema of hand and wrist, +decreased ROM and pain with motion. bandages overlying I&D site clean and dry. some erythema over the dorsal forearm which is significantly receded from the marked line on his upper arm.   LUE: patchy erythema/warmth on L forearm, multiple small abrasions including at L elbow crease. No fluctuance/crepitus  RLE: +erythema, warmth over R anterior shin, appearing to extend beyond marked [place], moderate tenderness on palpation.   LLE: no rash/edema.   Vascular: 2+ radial, DP/PT pulses B/L  Neurological: AAOx3; no focal deficits    MEDICATIONS:  MEDICATIONS  (STANDING):  chlordiazePOXIDE 50 milliGRAM(s) Oral every 8 hours  potassium chloride    Tablet ER 20 milliEquivalent(s) Oral every 2 hours  vancomycin  IVPB 1000 milliGRAM(s) IV Intermittent every 8 hours    MEDICATIONS  (PRN):  acetaminophen   Tablet .. 650 milliGRAM(s) Oral every 6 hours PRN Moderate Pain (4 - 6)  LORazepam   Injectable 1 milliGRAM(s) IV Push every 4 hours PRN CIWA>7    ALLERGIES:  Allergies  clindamycin (Unknown)    LABS:                        10.5   9.5   )-----------( 216      ( 02 Oct 2018 05:51 )             31.4     10-02    137  |  100  |  7   ----------------------------<  125<H>  3.4<L>   |  25  |  0.99    Ca    8.4      02 Oct 2018 05:51  Mg     2.5     10-02    TPro  6.4  /  Alb  3.2<L>  /  TBili  0.4  /  DBili  x   /  AST  26  /  ALT  27  /  AlkPhos  57  10-02    PT/INR - ( 02 Oct 2018 05:51 )   PT: 16.1 sec;   INR: 1.44     PTT - ( 01 Oct 2018 09:07 )  PTT:26.0 sec    RADIOLOGY & ADDITIONAL TESTS: Reviewed.

## 2018-10-02 NOTE — PROGRESS NOTE ADULT - PROBLEM SELECTOR PLAN 3
Reports taking eight Klonopin daily x 3 weeks. Unknown dose. Last used 10/1 PM. No hx seizure or intubations, but multiple detox for benzo w/d.   - alcohol negative  - Will monitor CIWA throughout night and use CIWA protocol. Will consult ICU for any CIWA >14 or for uncontrolled sx.  -c/w librium 50Q8  -ativan 2mg IVP Q2H PRN CIWA>7

## 2018-10-02 NOTE — OCCUPATIONAL THERAPY INITIAL EVALUATION ADULT - PERTINENT HX OF CURRENT PROBLEM, REHAB EVAL
Pt is a 33M with a history of IVDA (heroin, cocaine) and klonopin abuse who presents with R arm pain and swelling x several days. He began using IV heroin/cocaine again 3 weeks ago after a long period of abstinence (maintained on suboxone). Noticed redness/pain/swelling along bilateral forearms at sites of injections. R arm more painful than L.
Pt is a 33M with a history of IVDA (heroin, cocaine) and klonopin abuse who presents with R arm pain and swelling x several days. He began using IV heroin/cocaine again 3 weeks ago after a long period of abstinence (maintained on suboxone). Noticed redness/pain/swelling along bilateral forearms at sites of injections. R arm more painful than L.

## 2018-10-02 NOTE — PROGRESS NOTE ADULT - PROBLEM SELECTOR PLAN 5
-Resolved.  initially. 2/2 sepsis +/- cocaine. f/u utox. mgmt of sepsis as above (#1).  -no e/o PE or thyrotoxicosis

## 2018-10-02 NOTE — OCCUPATIONAL THERAPY INITIAL EVALUATION ADULT - DIAGNOSIS, OT EVAL
Most painful site was distal lateral R forearm. Pain is sharp and sore. No joint pain or swelling, but does report difficulty in moving wrist and fingers in R hand (limited 2/2 stiffness, not pain). No numbness/tingling in hands. Questionable chills. No hx MRSA or IE. Had cellulitis of a foot (doesnt know laterality) three years ago and was treated with IV abx for 2-3 days with resolution.
Most painful site was distal lateral R forearm. Pain is sharp and sore. No joint pain or swelling, but does report difficulty in moving wrist and fingers in R hand (limited 2/2 stiffness, not pain). No numbness/tingling in hands. Questionable chills. No hx MRSA or IE. Had cellulitis of a foot (doesnt know laterality) three years ago and was treated with IV abx for 2-3 days with resolution.

## 2018-10-03 LAB
CULTURE RESULTS: SIGNIFICANT CHANGE UP
SPECIMEN SOURCE: SIGNIFICANT CHANGE UP

## 2018-10-03 RX ORDER — BUPRENORPHINE AND NALOXONE 2; .5 MG/1; MG/1
0 TABLET SUBLINGUAL
Qty: 0 | Refills: 0 | COMMUNITY

## 2018-10-03 NOTE — DISCHARGE NOTE ADULT - PATIENT PORTAL LINK FT
You can access the [facility] Patient Portal, offered by [facility], by registering with the following website: [email or web address]

## 2018-10-03 NOTE — DISCHARGE NOTE ADULT - HOSPITAL COURSE
Patient is leaving AMA.     Pt is a 33M with a history of IVDA (heroin, cocaine) and klonopin abuse who presented with R arm pain and swelling x several days. He began using IV heroin/cocaine again 3 weeks ago after a long period of abstinence (maintained on suboxone). Noticed redness/pain/swelling along bilateral forearms at sites of injections. R arm more painful than L. Most painful site was distal lateral R forearm. Pain was sharp and sore. No joint pain or swelling, but reported difficulty in moving wrist and fingers in R hand (limited 2/2 stiffness, not pain). No numbness/tingling in hands. Questionable chills. No hx MRSA or IE. Had cellulitis of a foot three years ago and was treated with IV abx for 2-3 days with resolution. Denied CP, palp, HA, neck stiffness, sore throat, cough, SOB, abd pain, N/V/D/C, dysuria, edema. Said he uses $400 worth of drugs daily. Could not identify trigger for relapse. Uses ~8 klonopin daily. He has gone through detox for benzo w/d, but never req ICU or intubation. No hx seizures or DTs. Denied etoh use. Denied nasal use of drugs. Last drug use 12 hours ago. Denied anx/dep/SI/HI/AH/VH. Otherwise, ROS negative. In the ED, spiked a fever of 102.3 (rectally) which defervesced, CBC showed WBC 16.6 (neutrophil predominant), normal lactate, Na+ 128, INR 1.42, CRP >12, ESR 42. XR forearm revealed no foreign bodies. CT R forearm showed a 3.8x3x1.3 cm abscess in the lateral R forearm with extensive cellulitis but no e/o osteo. Plastic surgery performed beside I&D. He received vanc, zosyn, dilaudid (1 mg IV x 2), toradol 30 IVP and NS bolus 3.75L. He was admitted to [facility] where he was started on tylenol prn for fevers, continued on vancomycin, continued on librium 50 q8, and ativan 2mg prn q2. On 10/3 PM, multiple nurses witnessed patient's eyes rolling to the back of his head while he was standing. His speech was slurred at the time, and patient was unstable while standing. He was hemodynamically stable but dropping things he was holding. Patient denied ingesting a drug. Security was called but patient refused that his bags be searched and asked to leave AMA. risks and benefits of leaving AMA were discussed and patient verbalized understanding.

## 2018-10-03 NOTE — DISCHARGE NOTE ADULT - CARE PLAN
Principal Discharge DX:	Sepsis, due to unspecified organism  Goal:	Treatment of sepsis  Assessment and plan of treatment:	You came to the hospital because you have bacteria in your blood because of an infection on your arm. You were started on antibiotics. You are now leaving against medical advice. We have explained the risk and benefits of leaving the hospital and the danger of not receiving antibiotics for your condition. You have verbalized understanding and have decided to proceed with the discharge.

## 2018-10-03 NOTE — PROGRESS NOTE ADULT - SUBJECTIVE AND OBJECTIVE BOX
I attempted to examine the patient during rounds this morning, however, the  nurse informed me that he left AMA.

## 2018-10-03 NOTE — DISCHARGE NOTE ADULT - PLAN OF CARE
Treatment of sepsis You came to the hospital because you have bacteria in your blood because of an infection on your arm. You were started on antibiotics. You are now leaving against medical advice. We have explained the risk and benefits of leaving the hospital and the danger of not receiving antibiotics for your condition. You have verbalized understanding and have decided to proceed with the discharge.

## 2018-10-03 NOTE — DISCHARGE NOTE ADULT - MEDICATION SUMMARY - MEDICATIONS TO TAKE
I will START or STAY ON the medications listed below when I get home from the hospital:    traZODone 50 mg oral tablet  -- 1 tab(s) by mouth once a day  -- Indication: For Home medication

## 2018-10-04 ENCOUNTER — EMERGENCY (EMERGENCY)
Facility: HOSPITAL | Age: 33
LOS: 1 days | Discharge: HOME | End: 2018-10-04
Attending: EMERGENCY MEDICINE | Admitting: EMERGENCY MEDICINE
Payer: COMMERCIAL

## 2018-10-04 VITALS
TEMPERATURE: 100 F | OXYGEN SATURATION: 96 % | SYSTOLIC BLOOD PRESSURE: 133 MMHG | HEART RATE: 131 BPM | DIASTOLIC BLOOD PRESSURE: 92 MMHG | RESPIRATION RATE: 20 BRPM

## 2018-10-04 DIAGNOSIS — Z79.899 OTHER LONG TERM (CURRENT) DRUG THERAPY: ICD-10-CM

## 2018-10-04 DIAGNOSIS — Z88.1 ALLERGY STATUS TO OTHER ANTIBIOTIC AGENTS STATUS: ICD-10-CM

## 2018-10-04 DIAGNOSIS — L03.113 CELLULITIS OF RIGHT UPPER LIMB: ICD-10-CM

## 2018-10-04 DIAGNOSIS — L02.413 CUTANEOUS ABSCESS OF RIGHT UPPER LIMB: ICD-10-CM

## 2018-10-04 DIAGNOSIS — M79.601 PAIN IN RIGHT ARM: ICD-10-CM

## 2018-10-04 DIAGNOSIS — L02.414 CUTANEOUS ABSCESS OF LEFT UPPER LIMB: ICD-10-CM

## 2018-10-04 DIAGNOSIS — L03.114 CELLULITIS OF LEFT UPPER LIMB: ICD-10-CM

## 2018-10-04 PROCEDURE — 99284 EMERGENCY DEPT VISIT MOD MDM: CPT | Mod: 25

## 2018-10-05 VITALS
SYSTOLIC BLOOD PRESSURE: 116 MMHG | OXYGEN SATURATION: 100 % | RESPIRATION RATE: 16 BRPM | HEART RATE: 77 BPM | TEMPERATURE: 99 F | DIASTOLIC BLOOD PRESSURE: 75 MMHG

## 2018-10-05 LAB
ALBUMIN SERPL ELPH-MCNC: 2.8 G/DL — LOW (ref 3.4–5)
ALP SERPL-CCNC: 67 U/L — SIGNIFICANT CHANGE UP (ref 40–120)
ALT FLD-CCNC: 59 U/L — HIGH (ref 12–42)
ANION GAP SERPL CALC-SCNC: 13 MMOL/L — SIGNIFICANT CHANGE UP (ref 9–16)
AST SERPL-CCNC: 84 U/L — HIGH (ref 15–37)
BASOPHILS NFR BLD AUTO: 0.2 % — SIGNIFICANT CHANGE UP (ref 0–2)
BILIRUB SERPL-MCNC: 0.2 MG/DL — SIGNIFICANT CHANGE UP (ref 0.2–1.2)
BUN SERPL-MCNC: 12 MG/DL — SIGNIFICANT CHANGE UP (ref 7–23)
CALCIUM SERPL-MCNC: 8.2 MG/DL — LOW (ref 8.5–10.5)
CHLORIDE SERPL-SCNC: 104 MMOL/L — SIGNIFICANT CHANGE UP (ref 96–108)
CO2 SERPL-SCNC: 22 MMOL/L — SIGNIFICANT CHANGE UP (ref 22–31)
CREAT SERPL-MCNC: 1.21 MG/DL — SIGNIFICANT CHANGE UP (ref 0.5–1.3)
EOSINOPHIL NFR BLD AUTO: 2.6 % — SIGNIFICANT CHANGE UP (ref 0–6)
ETHANOL SERPL-MCNC: 12 MG/DL — HIGH
GLUCOSE SERPL-MCNC: 123 MG/DL — HIGH (ref 70–99)
HCT VFR BLD CALC: 33 % — LOW (ref 39–50)
HGB BLD-MCNC: 11 G/DL — LOW (ref 13–17)
IMM GRANULOCYTES NFR BLD AUTO: 0.5 % — SIGNIFICANT CHANGE UP (ref 0–1.5)
LACTATE SERPL-SCNC: 0.8 MMOL/L — SIGNIFICANT CHANGE UP (ref 0.4–2)
LYMPHOCYTES # BLD AUTO: 16.4 % — SIGNIFICANT CHANGE UP (ref 13–44)
MCHC RBC-ENTMCNC: 27.6 PG — SIGNIFICANT CHANGE UP (ref 27–34)
MCHC RBC-ENTMCNC: 33.3 G/DL — SIGNIFICANT CHANGE UP (ref 32–36)
MCV RBC AUTO: 82.9 FL — SIGNIFICANT CHANGE UP (ref 80–100)
MONOCYTES NFR BLD AUTO: 10.2 % — SIGNIFICANT CHANGE UP (ref 2–14)
NEUTROPHILS NFR BLD AUTO: 70.1 % — SIGNIFICANT CHANGE UP (ref 43–77)
PLATELET # BLD AUTO: 362 K/UL — SIGNIFICANT CHANGE UP (ref 150–400)
POTASSIUM SERPL-MCNC: 3.4 MMOL/L — LOW (ref 3.5–5.3)
POTASSIUM SERPL-SCNC: 3.4 MMOL/L — LOW (ref 3.5–5.3)
PROT SERPL-MCNC: 7.2 G/DL — SIGNIFICANT CHANGE UP (ref 6.4–8.2)
RBC # BLD: 3.98 M/UL — LOW (ref 4.2–5.8)
RBC # FLD: 11.7 % — SIGNIFICANT CHANGE UP (ref 10.3–14.5)
SODIUM SERPL-SCNC: 139 MMOL/L — SIGNIFICANT CHANGE UP (ref 132–145)
WBC # BLD: 8.4 K/UL — SIGNIFICANT CHANGE UP (ref 3.8–10.5)
WBC # FLD AUTO: 8.4 K/UL — SIGNIFICANT CHANGE UP (ref 3.8–10.5)

## 2018-10-05 PROCEDURE — 99234 HOSP IP/OBS SM DT SF/LOW 45: CPT

## 2018-10-05 PROCEDURE — 93970 EXTREMITY STUDY: CPT | Mod: 26

## 2018-10-05 RX ORDER — AZTREONAM 2 G
2 VIAL (EA) INJECTION
Qty: 40 | Refills: 0 | OUTPATIENT
Start: 2018-10-05 | End: 2018-10-14

## 2018-10-05 RX ORDER — POTASSIUM CHLORIDE 20 MEQ
40 PACKET (EA) ORAL ONCE
Qty: 0 | Refills: 0 | Status: COMPLETED | OUTPATIENT
Start: 2018-10-05 | End: 2018-10-05

## 2018-10-05 RX ORDER — AMPICILLIN SODIUM AND SULBACTAM SODIUM 250; 125 MG/ML; MG/ML
INJECTION, POWDER, FOR SUSPENSION INTRAMUSCULAR; INTRAVENOUS
Qty: 0 | Refills: 0 | Status: DISCONTINUED | OUTPATIENT
Start: 2018-10-05 | End: 2018-10-08

## 2018-10-05 RX ORDER — SODIUM CHLORIDE 9 MG/ML
2000 INJECTION INTRAMUSCULAR; INTRAVENOUS; SUBCUTANEOUS ONCE
Qty: 0 | Refills: 0 | Status: COMPLETED | OUTPATIENT
Start: 2018-10-05 | End: 2018-10-05

## 2018-10-05 RX ORDER — AMPICILLIN SODIUM AND SULBACTAM SODIUM 250; 125 MG/ML; MG/ML
3 INJECTION, POWDER, FOR SUSPENSION INTRAMUSCULAR; INTRAVENOUS EVERY 6 HOURS
Qty: 0 | Refills: 0 | Status: DISCONTINUED | OUTPATIENT
Start: 2018-10-05 | End: 2018-10-08

## 2018-10-05 RX ORDER — VANCOMYCIN HCL 1 G
1000 VIAL (EA) INTRAVENOUS ONCE
Qty: 0 | Refills: 0 | Status: COMPLETED | OUTPATIENT
Start: 2018-10-05 | End: 2018-10-05

## 2018-10-05 RX ORDER — VANCOMYCIN HCL 1 G
VIAL (EA) INTRAVENOUS
Qty: 0 | Refills: 0 | Status: DISCONTINUED | OUTPATIENT
Start: 2018-10-05 | End: 2018-10-05

## 2018-10-05 RX ORDER — AMPICILLIN SODIUM AND SULBACTAM SODIUM 250; 125 MG/ML; MG/ML
3 INJECTION, POWDER, FOR SUSPENSION INTRAMUSCULAR; INTRAVENOUS ONCE
Qty: 0 | Refills: 0 | Status: COMPLETED | OUTPATIENT
Start: 2018-10-05 | End: 2018-10-05

## 2018-10-05 RX ORDER — VANCOMYCIN HCL 1 G
1500 VIAL (EA) INTRAVENOUS ONCE
Qty: 0 | Refills: 0 | Status: COMPLETED | OUTPATIENT
Start: 2018-10-05 | End: 2018-10-05

## 2018-10-05 RX ADMIN — Medication 40 MILLIEQUIVALENT(S): at 15:33

## 2018-10-05 RX ADMIN — Medication 300 MILLIGRAM(S): at 16:45

## 2018-10-05 RX ADMIN — SODIUM CHLORIDE 2000 MILLILITER(S): 9 INJECTION INTRAMUSCULAR; INTRAVENOUS; SUBCUTANEOUS at 13:46

## 2018-10-05 RX ADMIN — AMPICILLIN SODIUM AND SULBACTAM SODIUM 200 GRAM(S): 250; 125 INJECTION, POWDER, FOR SUSPENSION INTRAMUSCULAR; INTRAVENOUS at 01:54

## 2018-10-05 RX ADMIN — AMPICILLIN SODIUM AND SULBACTAM SODIUM 200 GRAM(S): 250; 125 INJECTION, POWDER, FOR SUSPENSION INTRAMUSCULAR; INTRAVENOUS at 15:00

## 2018-10-05 RX ADMIN — Medication 250 MILLIGRAM(S): at 03:45

## 2018-10-05 RX ADMIN — AMPICILLIN SODIUM AND SULBACTAM SODIUM 200 GRAM(S): 250; 125 INJECTION, POWDER, FOR SUSPENSION INTRAMUSCULAR; INTRAVENOUS at 08:53

## 2018-10-05 NOTE — ED ADULT NURSE NOTE - NS ED NURSE DISCH DISPOSITION
Transferred AMA (saw a physician/midlevel provider and clinician was able to provide reasons for staying for treatment & form is signed)

## 2018-10-05 NOTE — ED CDU PROVIDER INITIAL DAY NOTE - OBJECTIVE STATEMENT
Pt is a 33M with a history of IVDA (heroin, cocaine) and klonopin abuse who presented to [facility] on Oct 2nd with R arm pain and swelling x several days. He began using IV heroin/cocaine again 3 weeks ago after a long period of abstinence (maintained on suboxone). Patient was admitted to [facility], and placed on IV Vanco, Zosyn. Patient subsequently signed out AMA from [facility] yesterday. Patient endorses he used a speedball injecting it into his L arm prior to arrival. Patient has bilateral forearm cellulitis and abscess to R arm, s/p ID with plastic surgery dr hunter. patient denies fever, chills, denies arm numbness, weakness or N/V/D. denies abd pain. denies cp or sob. Denies seizure. patient expresses regret for signing out ama from the floor, and would like to be readmitted. denies suicidal or homocidal ideation. denies hallucinations.

## 2018-10-05 NOTE — ED PROVIDER NOTE - OBJECTIVE STATEMENT
Pt is a 33M with a history of IVDA (heroin, cocaine) and klonopin abuse who presented to [facility] on Oct 2nd with R arm pain and swelling x several days. He began using IV heroin/cocaine again 3 weeks ago after a long period of abstinence (maintained on suboxone). Patient was admitted to [facility], and placed on IV Vanco, Zosyn. Patient subsequently signed out AMA from [facility] yesterday. Patient endorses he used a speedball injecting it into his L arm prior to arrival. Patient has bilateral forearm cellulitis and abscess to R arm, s/p ID with plastic surgery dr hunter. patient denies fever, chills, denies arm numbness, weakness or N/V/D. denies abd pain. denies cp or sob. Denies seizure. patient expresses regret for signing out ama from the floor, and would like to be readmitted. denies suicidal or homocidal ideation. denies hallucinations.

## 2018-10-05 NOTE — ED PROVIDER NOTE - CARE PLAN
Principal Discharge DX:	Cellulitis and abscess  Secondary Diagnosis:	IVDA (intravenous drug abuse) complicating pregnancy  Secondary Diagnosis:	Cellulitis of upper extremity, unspecified laterality Principal Discharge DX:	Cellulitis and abscess  Secondary Diagnosis:	Intravenous drug abuse

## 2018-10-05 NOTE — ED ADULT NURSE REASSESSMENT NOTE - GENERAL PATIENT STATE
comfortable appearance/awakens with ease/resting/sleeping/cooperative
comfortable appearance/cooperative/smiling/interactive
resting/sleeping/awakens with ease/comfortable appearance/cooperative

## 2018-10-05 NOTE — ED CDU PROVIDER INITIAL DAY NOTE - MEDICAL DECISION MAKING DETAILS
will repeat labs, start vanco. R arm wound dressing change, and packing removed, healing well. called dr pretty on regional floor at [facility] where patient signed out ama. will place in obs at [facility], as there are no beds at this time at [facility], and await possible admission, vs if patient improves clinically he can be discharged. patient amenable to speaking with roderick in the morning about detox options. patient clinically improved since admission at [facility], and may be treated with outpatient abx if he does not spike fevers, or wounds don't worsen. will also rule out DVT as RLE with chronic edema and overlying cellulitis.

## 2018-10-05 NOTE — ED PROVIDER NOTE - MEDICAL DECISION MAKING DETAILS
will repeat labs, start vanco. R arm wound dressing change, and packing removed, healing well. called dr pretty on regional floor at [facility] where patient signed out ama. will place in obs at [facility], as there are no beds at this time at [facility], and await possible admission, vs if patient improves clinically he can be discharged. patient amenable to speaking with roderick in the morning about detox options. patient clinically improved since admission at [facility], and may be treated with outpatient abx if he does not spike fevers, or wounds don't worsen. will also rule out DVT as RLE with chronic edema and overlying cellulitis.

## 2018-10-05 NOTE — ED CDU PROVIDER DISPOSITION NOTE - CLINICAL COURSE
Patient placed on obs for tx cellulitis. Patient had I + D last week by Plastics. AMA'd and used Heroin/Speedball and came to the ED. Getting IV abx in ED. BP went into the 90's around 2p. Decision to admit to [facility] for possible sepsis. Wound cx strep pyogeneses.

## 2018-10-05 NOTE — ED ADULT NURSE REASSESSMENT NOTE - NS ED NURSE REASSESS COMMENT FT1
Pt received from ER RN ZACH. Pt resting in bed denies pain or sob at this time. Pt has noted redness and scabbing to BL arms and right lower extremity. Pt has mild slurred speech at this time and admits to drug use yesterday. Pt stable at this time with no s.s of acute distress. Pt will continue to monitor.

## 2018-10-05 NOTE — ED PROVIDER NOTE - LOCATION
leg/erythema, warmth and ttp, no crepitus. no palpable abscess or track marks. bilateral arm erythema, warmth, and ttp. R forearm with 3 cm incision site, open, fibrinous base with no drainage, or crepitus. no induration, or palpable flucutance./arm bilateral multiple regions of scar, and fibrinous wound healing over forearms/arm

## 2018-10-05 NOTE — ED ADULT NURSE NOTE - INTERVENTIONS DEFINITIONS
Call bell, personal items and telephone within reach/Physically safe environment: no spills, clutter or unnecessary equipment

## 2018-10-05 NOTE — ED CDU PROVIDER INITIAL DAY NOTE - PROGRESS NOTE DETAILS
No issues, getting I abx BP lower , getting IVF. Approached patient about admit to [facility]- agreeable. Will call to set up transfer. Accepted for medicine admit to [facility]. Patient agreeable, Dr. Teran of medicine ICU aware. Patient accepted to the floor by Dr. Crowder. No private rooms avail.

## 2018-10-05 NOTE — ED ADULT NURSE NOTE - OBJECTIVE STATEMENT
34 YO male ambulated to ER c/o cellulitis to bilateral arms. patient was seen in ER previously for same symptoms and admitted. patient states he left the hospital AMA today.

## 2018-10-05 NOTE — ED PROVIDER NOTE - SECONDARY DIAGNOSIS.
IVDA (intravenous drug abuse) complicating pregnancy Cellulitis of upper extremity, unspecified laterality Intravenous drug abuse

## 2018-10-05 NOTE — ED ADULT NURSE REASSESSMENT NOTE - COMFORT CARE
plan of care explained/meal provided/po fluids offered
plan of care explained/wait time explained
plan of care explained/meal offered-refused

## 2018-10-05 NOTE — SBIRT NOTE. - NSSBIRTSERVICES_GEN_A_ED_FT
Upon reassessment SBIRT services was provided: Full screen positive. Referral to Treatment attempted. Screening results were reviewed with the patient and patient was provided information about healthy guidelines and potential negative consequences associated with level of risk. Motivation and readiness to reduce or stop use was discussed and goals and activities to make changes were suggested/offered.  Options discussed for further evaluation and treatment, but referral to treatment was not completed because Patient requires medical care.  Patient was provided and trained on naloxone.   Audit Score:0  DAST Score:4  Duration = 18 Minutes

## 2018-10-05 NOTE — ED PROVIDER NOTE - PROGRESS NOTE DETAILS
Got sign out from day team pending admission bed availability. Pt then expressed wanting to leave the hospital against medical advice. Will prescribe bactrim/augmentin. Encouraged RTER if any worsening The patient wishes to leave against medical advice.  I have discussed the risks, benefits and alternatives (including the possibility of worsening of disease, sepsis, pain, permanent disability, and/or death) with the patient.  The patient voices understanding of these risks, benefits, and alternatives and still wishes to sign out against medical advice.  He wants to be rpescribed antibiotics and leave AMA. The patient is awake, alert, oriented  x 3 and has demonstrated capacity to refuse/direct care.  I have advised the patient that they can and should return immediately should they develop any worse/different/additional symptoms, or if they change their mind and want to continue their care.

## 2018-10-06 LAB
CULTURE RESULTS: SIGNIFICANT CHANGE UP
CULTURE RESULTS: SIGNIFICANT CHANGE UP
SPECIMEN SOURCE: SIGNIFICANT CHANGE UP
SPECIMEN SOURCE: SIGNIFICANT CHANGE UP

## 2018-10-10 PROBLEM — O99.320 DRUG USE COMPLICATING PREGNANCY, UNSPECIFIED TRIMESTER: Chronic | Status: INACTIVE | Noted: 2018-10-01 | Resolved: 2018-10-09

## 2018-10-10 LAB
CULTURE RESULTS: SIGNIFICANT CHANGE UP
SPECIMEN SOURCE: SIGNIFICANT CHANGE UP

## 2018-10-15 DIAGNOSIS — Z53.21 PROCEDURE AND TREATMENT NOT CARRIED OUT DUE TO PATIENT LEAVING PRIOR TO BEING SEEN BY HEALTH CARE PROVIDER: ICD-10-CM

## 2018-10-15 DIAGNOSIS — A41.9 SEPSIS, UNSPECIFIED ORGANISM: ICD-10-CM

## 2018-10-15 DIAGNOSIS — L03.113 CELLULITIS OF RIGHT UPPER LIMB: ICD-10-CM

## 2018-10-15 DIAGNOSIS — F19.10 OTHER PSYCHOACTIVE SUBSTANCE ABUSE, UNCOMPLICATED: ICD-10-CM

## 2018-10-15 DIAGNOSIS — F13.10 SEDATIVE, HYPNOTIC OR ANXIOLYTIC ABUSE, UNCOMPLICATED: ICD-10-CM

## 2018-10-15 DIAGNOSIS — L02.413 CUTANEOUS ABSCESS OF RIGHT UPPER LIMB: ICD-10-CM

## 2018-10-15 DIAGNOSIS — Z88.1 ALLERGY STATUS TO OTHER ANTIBIOTIC AGENTS STATUS: ICD-10-CM

## 2018-10-15 DIAGNOSIS — E87.1 HYPO-OSMOLALITY AND HYPONATREMIA: ICD-10-CM

## 2018-10-31 ENCOUNTER — EMERGENCY (EMERGENCY)
Facility: HOSPITAL | Age: 33
LOS: 1 days | Discharge: HOME | End: 2018-10-31
Attending: EMERGENCY MEDICINE | Admitting: EMERGENCY MEDICINE
Payer: COMMERCIAL

## 2018-10-31 VITALS
RESPIRATION RATE: 18 BRPM | OXYGEN SATURATION: 99 % | DIASTOLIC BLOOD PRESSURE: 75 MMHG | HEART RATE: 115 BPM | SYSTOLIC BLOOD PRESSURE: 110 MMHG

## 2018-10-31 VITALS
OXYGEN SATURATION: 97 % | RESPIRATION RATE: 18 BRPM | HEART RATE: 126 BPM | HEIGHT: 75 IN | DIASTOLIC BLOOD PRESSURE: 73 MMHG | TEMPERATURE: 97 F | SYSTOLIC BLOOD PRESSURE: 111 MMHG

## 2018-10-31 PROCEDURE — 99283 EMERGENCY DEPT VISIT LOW MDM: CPT

## 2018-10-31 NOTE — ED ADULT TRIAGE NOTE - CHIEF COMPLAINT QUOTE
Pt states "I was hospitalized last friday to sunday for an abscess on my right arm and was given IV abx, the surgeon stated that the wound opened itself so I am supposed to have the packing changed daily then in 1 week to follow up with a surgeon".  Pt denies pain, fever, chills, nausea vomiting at this time and states he is currently taking Bactrim and another abx but not sure of the name.

## 2018-10-31 NOTE — ED PROVIDER NOTE - SKIN AREA #1
2 cm open wound with packing at I&D site, expressed pus, and serosanguinous fluid. no crepitus. palpable underlying induration. no streaking or erythema or warmth surrounding site./lateral/proximal

## 2018-10-31 NOTE — ED ADULT NURSE NOTE - NSIMPLEMENTINTERV_GEN_ALL_ED
Implemented All Universal Safety Interventions:  [place] to call system. Call bell, personal items and telephone within reach. Instruct patient to call for assistance. Room bathroom lighting operational. Non-slip footwear when patient is off stretcher. Physically safe environment: no spills, clutter or unnecessary equipment. Stretcher in lowest position, wheels locked, appropriate side rails in place.

## 2018-10-31 NOTE — ED ADULT NURSE NOTE - OBJECTIVE STATEMENT
pt aox4. neurologically wnl. no sob ot difficulty breathing noted. lung sounds clear bilaterally. skin appropriate for ethnicity, warm and dry. cap refill < 2 secs. pulses 2+ and regular. abd rounded soft and nontender. pt presents with abscess to R forearm. would is packed and draining serosanguinous fluiid.  pt states that he is here for repacking and checking of wound. multiple old abcesses noted to bilateral arms. pt with hx of IVDA.

## 2018-10-31 NOTE — ED PROVIDER NOTE - LOCATION
elbow/distal median/radial/ulnar nn intact to motor and sensory. multiple old scarring lesions from prior I&ds/arm

## 2018-10-31 NOTE — ED PROVIDER NOTE - MEDICAL DECISION MAKING DETAILS
packing removed, wound healing well with no underlying crepitus, erythema or cellultiis. on appropriate abx. afebrile and nontoxic appearing. clean and pack and dress wound. advised to return tomorrow for follow up. culture sent.

## 2018-10-31 NOTE — ED PROVIDER NOTE - OBJECTIVE STATEMENT
Patient with hx of IVDA, hx of multiple I&D to bilateral arms for abscess and cellulitis from contaminated needles. denies hx of hiv or hep. notes he is here for a wound check to R forearm, s/p I&D of abscess yesterday at a [facility]. he recently left there 2 days ago for rehab, placed on methadone taper. notes no pain to forearm, denies fever, chills. was seen by a surgeon while in rehab, and returned to [place] where he lives. notes he was told to have packing inspected and removed everyday until it heals. denies numbness, paresthesias or weakness to forearm. denies rash. denies cp, sob or abd pain. denies N/V/D. placed on bactrim and pcn and compliant with medications for past 3 days.

## 2018-11-01 PROBLEM — F19.10 OTHER PSYCHOACTIVE SUBSTANCE ABUSE, UNCOMPLICATED: Chronic | Status: ACTIVE | Noted: 2018-10-09

## 2018-11-02 ENCOUNTER — EMERGENCY (EMERGENCY)
Facility: HOSPITAL | Age: 33
LOS: 1 days | Discharge: HOME | End: 2018-11-02
Attending: EMERGENCY MEDICINE | Admitting: EMERGENCY MEDICINE
Payer: COMMERCIAL

## 2018-11-02 ENCOUNTER — EMERGENCY (EMERGENCY)
Facility: HOSPITAL | Age: 33
LOS: 1 days | Discharge: HOME | End: 2018-11-02
Admitting: EMERGENCY MEDICINE
Payer: COMMERCIAL

## 2018-11-02 VITALS
SYSTOLIC BLOOD PRESSURE: 145 MMHG | OXYGEN SATURATION: 100 % | DIASTOLIC BLOOD PRESSURE: 91 MMHG | RESPIRATION RATE: 16 BRPM | HEART RATE: 77 BPM | TEMPERATURE: 99 F

## 2018-11-02 VITALS
RESPIRATION RATE: 16 BRPM | SYSTOLIC BLOOD PRESSURE: 169 MMHG | TEMPERATURE: 98 F | DIASTOLIC BLOOD PRESSURE: 105 MMHG | HEART RATE: 118 BPM | OXYGEN SATURATION: 99 %

## 2018-11-02 VITALS
RESPIRATION RATE: 15 BRPM | HEART RATE: 98 BPM | OXYGEN SATURATION: 98 % | DIASTOLIC BLOOD PRESSURE: 85 MMHG | SYSTOLIC BLOOD PRESSURE: 151 MMHG

## 2018-11-02 DIAGNOSIS — Z48.01 ENCOUNTER FOR CHANGE OR REMOVAL OF SURGICAL WOUND DRESSING: ICD-10-CM

## 2018-11-02 DIAGNOSIS — Z79.2 LONG TERM (CURRENT) USE OF ANTIBIOTICS: ICD-10-CM

## 2018-11-02 DIAGNOSIS — Z79.899 OTHER LONG TERM (CURRENT) DRUG THERAPY: ICD-10-CM

## 2018-11-02 PROCEDURE — 99282 EMERGENCY DEPT VISIT SF MDM: CPT | Mod: 25

## 2018-11-02 PROCEDURE — 99282 EMERGENCY DEPT VISIT SF MDM: CPT

## 2018-11-02 RX ORDER — BUPRENORPHINE AND NALOXONE 2; .5 MG/1; MG/1
0 TABLET SUBLINGUAL
Qty: 0 | Refills: 0 | COMMUNITY

## 2018-11-02 RX ORDER — TRAZODONE HCL 50 MG
1 TABLET ORAL
Qty: 0 | Refills: 0 | COMMUNITY

## 2018-11-02 NOTE — ED ADULT NURSE NOTE - NSIMPLEMENTINTERV_GEN_ALL_ED
Implemented All Universal Safety Interventions:  [place] to call system. Call bell, personal items and telephone within reach. Instruct patient to call for assistance. Room bathroom lighting operational. Non-slip footwear when patient is off stretcher. Physically safe environment: no spills, clutter or unnecessary equipment. Stretcher in lowest position, wheels locked, appropriate side rails in place.

## 2018-11-02 NOTE — ED PROVIDER NOTE - MEDICAL DECISION MAKING DETAILS
Wound re-packed.  Pt with possible mild withdrawal but reports he is doing ok.  Wound appears well.  Will return tomorrow for re-check.  Will continue both abx.

## 2018-11-02 NOTE — ED PROVIDER NOTE - MEDICAL DECISION MAKING DETAILS
pt presents for packing exchange and wound check. pt has been taking antibiotics and wound has improved. no longer draining. redness is shrinking. no pain. packing exchanged. will d/c with wound check in 1-2 days.

## 2018-11-02 NOTE — ED PROVIDER NOTE - NSFOLLOWUPINSTRUCTIONS_ED_ALL_ED_FT
Return tomorrow for a wound check.     PLEASE RETURN TO THE ER IMMEDIATELY OR CALL 911 FOR ANY HIGH FEVER, TROUBLE BREATHING, VOMITING, SEVERE PAIN, OR ANY OTHER CONCERNS.

## 2018-11-02 NOTE — ED ADULT NURSE NOTE - CHIEF COMPLAINT QUOTE
Pt requesting to have his wound packing on the right arm changed because he got it wet after taking a shower tonight.

## 2018-11-02 NOTE — ED PROVIDER NOTE - OBJECTIVE STATEMENT
33 y.o M with PMHx IVDA, multiple I&D to bilateral arms for abscess and cellulitis from contaminated needles who presents to the ED with c/o wound check on right forearm s/p I&D of abscess on the 30th that was drained in [facility]. Pt has been seen here for multiple wound check for packing exchange. Pt is currently on Amoxicillin-clavulanate, Bactrim, and Trazodone. Redness, swelling and pain decreased. Wound drainage has decreased as well. Denies fever, N/V. 33 y.o M with PMHx IVDA, multiple I&D to bilateral arms for abscess and cellulitis from contaminated needles who presents to the ED with c/o wound check on right forearm s/p I&D of abscess on the 30th that was drained in [facility]. Pt has been seen here for multiple wound check for packing exchange. Redness, swelling and pain decreased. Wound drainage has decreased as well. Denies fever, N/V. Pt is currently on Amoxicillin-clavulanate, Bactrim, and Trazodone.

## 2018-11-02 NOTE — ED PROVIDER NOTE - SKIN, MLM
Significant for 2x3 cm area of redness approximately 1cm inside the previously drawn border. No active drainage or bleeding.

## 2018-11-02 NOTE — ED ADULT NURSE NOTE - NSIMPLEMENTINTERV_GEN_ALL_ED
Implemented All Universal Safety Interventions:  [place] to call system. Call bell, personal items and telephone within reach. Instruct patient to call for assistance. Room bathroom lighting operational. Non-slip footwear when patient is off stretcher. Physically safe environment: no spills, clutter or unnecessary equipment. Stretcher in lowest position, wheels locked, appropriate side rails in place.

## 2018-11-02 NOTE — ED PROVIDER NOTE - PHYSICAL EXAMINATION
GEN: Well appearing, well nourished, awake, alert, oriented to person, place, time/situation and in no apparent distress.  ENT: Airway patent, Nasal mucosa clear. Mouth with normal mucosa.  EYES: Clear bilaterally.  RESPIRATORY: Breathing comfortably with normal RR.  MSK: Range of motion is not limited, no deformities noted.  NEURO: Alert and oriented, no focal deficits.  SKIN:   PSYCH: Alert and oriented to person, place, time/situation. normal mood and affect. no apparent risk to self or others.. GEN: Well appearing, well nourished, awake, alert, oriented to person, place, time/situation and in no apparent distress.  ENT: Airway patent, Nasal mucosa clear with mild rhinorrhea. Mouth with normal mucosa.  EYES: Clear bilaterally.  RESPIRATORY: Breathing comfortably with normal RR.  CTAB.   CV: RRR, no murmurs.   MSK: Range of motion is not limited, no deformities noted.  NEURO: Alert and oriented, no focal deficits.  SKIN: Mild diaphoresis.  R proximal dorsal abscess s/p drainage with packing in placed - packing removed, no purulent discharge,  ~4x5cm surrounding erythema - does not appear as deep red as an acute infection.    PSYCH: Alert and oriented to person, place, time/situation. normal mood and affect. no apparent risk to self or others..

## 2018-11-02 NOTE — ED PROVIDER NOTE - NSFOLLOWUPINSTRUCTIONS_ED_ALL_ED_FT
RETURN TO WOUND CHECK IN 1-2 DAYS.   RETURN SOONER FOR FEVER, CHILLS, NAUSEA, VOMITING, INCREASED DRAINAGE.   CONTINUE ANTIBIOTIC REGIMEN.

## 2018-11-02 NOTE — ED PROVIDER NOTE - OBJECTIVE STATEMENT
Patient with hx of IVDA, hx of multiple I&D to bilateral arms for abscess and cellulitis from contaminated needles. denies hx of hiv or hep. notes he is here for a wound check to R forearm, s/p I&D of abscess on the 30th at a [facility]. he recently left there 4 days ago for rehab, placed on methadone taper. notes no pain to forearm, denies fever, chills. was seen by a surgeon while in rehab, and returned to [place] where he lives. notes he was told to have packing inspected and removed everyday until it heals. Here yesterday for packing change.  Already on Abx.  Comes in now requesting packing change because stook shower and packing became wet and disturbed.

## 2018-11-04 DIAGNOSIS — Z79.2 LONG TERM (CURRENT) USE OF ANTIBIOTICS: ICD-10-CM

## 2018-11-04 DIAGNOSIS — L02.413 CUTANEOUS ABSCESS OF RIGHT UPPER LIMB: ICD-10-CM

## 2018-11-04 DIAGNOSIS — Z88.1 ALLERGY STATUS TO OTHER ANTIBIOTIC AGENTS STATUS: ICD-10-CM

## 2018-11-04 DIAGNOSIS — Z48.01 ENCOUNTER FOR CHANGE OR REMOVAL OF SURGICAL WOUND DRESSING: ICD-10-CM

## 2018-11-04 DIAGNOSIS — F17.200 NICOTINE DEPENDENCE, UNSPECIFIED, UNCOMPLICATED: ICD-10-CM

## 2018-11-04 DIAGNOSIS — Z79.899 OTHER LONG TERM (CURRENT) DRUG THERAPY: ICD-10-CM

## 2018-11-05 LAB
CULTURE RESULTS: SIGNIFICANT CHANGE UP
SPECIMEN SOURCE: SIGNIFICANT CHANGE UP

## 2019-02-01 ENCOUNTER — EMERGENCY (EMERGENCY)
Facility: HOSPITAL | Age: 34
LOS: 1 days | Discharge: HOME | End: 2019-02-01
Attending: EMERGENCY MEDICINE | Admitting: EMERGENCY MEDICINE
Payer: COMMERCIAL

## 2019-02-01 VITALS
TEMPERATURE: 96 F | HEART RATE: 112 BPM | DIASTOLIC BLOOD PRESSURE: 86 MMHG | SYSTOLIC BLOOD PRESSURE: 141 MMHG | WEIGHT: 229.94 LBS | RESPIRATION RATE: 16 BRPM | OXYGEN SATURATION: 95 %

## 2019-02-01 LAB
ALBUMIN SERPL ELPH-MCNC: 3.5 G/DL — SIGNIFICANT CHANGE UP (ref 3.4–5)
ALP SERPL-CCNC: 74 U/L — SIGNIFICANT CHANGE UP (ref 40–120)
ALT FLD-CCNC: 18 U/L — SIGNIFICANT CHANGE UP (ref 12–42)
ANION GAP SERPL CALC-SCNC: 12 MMOL/L — SIGNIFICANT CHANGE UP (ref 9–16)
AST SERPL-CCNC: 22 U/L — SIGNIFICANT CHANGE UP (ref 15–37)
BASOPHILS NFR BLD AUTO: 0.3 % — SIGNIFICANT CHANGE UP (ref 0–2)
BILIRUB SERPL-MCNC: 0.7 MG/DL — SIGNIFICANT CHANGE UP (ref 0.2–1.2)
BUN SERPL-MCNC: 13 MG/DL — SIGNIFICANT CHANGE UP (ref 7–23)
CALCIUM SERPL-MCNC: 9 MG/DL — SIGNIFICANT CHANGE UP (ref 8.5–10.5)
CHLORIDE SERPL-SCNC: 99 MMOL/L — SIGNIFICANT CHANGE UP (ref 96–108)
CO2 SERPL-SCNC: 23 MMOL/L — SIGNIFICANT CHANGE UP (ref 22–31)
CREAT SERPL-MCNC: 0.99 MG/DL — SIGNIFICANT CHANGE UP (ref 0.5–1.3)
EOSINOPHIL NFR BLD AUTO: 0.1 % — SIGNIFICANT CHANGE UP (ref 0–6)
GLUCOSE SERPL-MCNC: 129 MG/DL — HIGH (ref 70–99)
HCT VFR BLD CALC: 35.1 % — LOW (ref 39–50)
HGB BLD-MCNC: 11.8 G/DL — LOW (ref 13–17)
IMM GRANULOCYTES NFR BLD AUTO: 0.3 % — SIGNIFICANT CHANGE UP (ref 0–1.5)
LYMPHOCYTES # BLD AUTO: 23.6 % — SIGNIFICANT CHANGE UP (ref 13–44)
MCHC RBC-ENTMCNC: 26.3 PG — LOW (ref 27–34)
MCHC RBC-ENTMCNC: 33.6 G/DL — SIGNIFICANT CHANGE UP (ref 32–36)
MCV RBC AUTO: 78.2 FL — LOW (ref 80–100)
MONOCYTES NFR BLD AUTO: 11 % — SIGNIFICANT CHANGE UP (ref 2–14)
NEUTROPHILS NFR BLD AUTO: 64.7 % — SIGNIFICANT CHANGE UP (ref 43–77)
PLATELET # BLD AUTO: 277 K/UL — SIGNIFICANT CHANGE UP (ref 150–400)
POTASSIUM SERPL-MCNC: 3.8 MMOL/L — SIGNIFICANT CHANGE UP (ref 3.5–5.3)
POTASSIUM SERPL-SCNC: 3.8 MMOL/L — SIGNIFICANT CHANGE UP (ref 3.5–5.3)
PROT SERPL-MCNC: 7.7 G/DL — SIGNIFICANT CHANGE UP (ref 6.4–8.2)
RBC # BLD: 4.49 M/UL — SIGNIFICANT CHANGE UP (ref 4.2–5.8)
RBC # FLD: 12.4 % — SIGNIFICANT CHANGE UP (ref 10.3–14.5)
SODIUM SERPL-SCNC: 134 MMOL/L — SIGNIFICANT CHANGE UP (ref 132–145)
WBC # BLD: 8 K/UL — SIGNIFICANT CHANGE UP (ref 3.8–10.5)
WBC # FLD AUTO: 8 K/UL — SIGNIFICANT CHANGE UP (ref 3.8–10.5)

## 2019-02-01 PROCEDURE — 73201 CT UPPER EXTREMITY W/DYE: CPT | Mod: 26,LT

## 2019-02-01 PROCEDURE — 99284 EMERGENCY DEPT VISIT MOD MDM: CPT

## 2019-02-01 RX ORDER — SODIUM CHLORIDE 9 MG/ML
1000 INJECTION INTRAMUSCULAR; INTRAVENOUS; SUBCUTANEOUS ONCE
Qty: 0 | Refills: 0 | Status: COMPLETED | OUTPATIENT
Start: 2019-02-01 | End: 2019-02-01

## 2019-02-01 RX ORDER — KETOROLAC TROMETHAMINE 30 MG/ML
30 SYRINGE (ML) INJECTION ONCE
Qty: 0 | Refills: 0 | Status: DISCONTINUED | OUTPATIENT
Start: 2019-02-01 | End: 2019-02-01

## 2019-02-01 RX ORDER — VANCOMYCIN HCL 1 G
VIAL (EA) INTRAVENOUS
Qty: 0 | Refills: 0 | Status: DISCONTINUED | OUTPATIENT
Start: 2019-02-01 | End: 2019-02-01

## 2019-02-01 RX ORDER — VANCOMYCIN HCL 1 G
2000 VIAL (EA) INTRAVENOUS ONCE
Qty: 0 | Refills: 0 | Status: COMPLETED | OUTPATIENT
Start: 2019-02-01 | End: 2019-02-01

## 2019-02-01 RX ADMIN — SODIUM CHLORIDE 1000 MILLILITER(S): 9 INJECTION INTRAMUSCULAR; INTRAVENOUS; SUBCUTANEOUS at 14:16

## 2019-02-01 RX ADMIN — Medication 250 MILLIGRAM(S): at 14:15

## 2019-02-01 RX ADMIN — Medication 30 MILLIGRAM(S): at 14:14

## 2019-02-01 NOTE — ED PROVIDER NOTE - PHYSICAL EXAMINATION
VITAL SIGNS: I have reviewed nursing notes and confirm.  CONSTITUTIONAL: Well-developed; well-nourished; in no acute distress.  SKIN: Skin is warm and dry, no acute rash.  HEAD: Normocephalic; atraumatic.  EYES: PERRL, EOM intact; conjunctiva and sclera clear.  ENT: No nasal discharge; airway clear.  NECK: Supple; non tender.  CARD: S1, S2 normal; no murmurs, gallops, or rubs. Regular rate and rhythm.  RESP: No wheezes, rales or rhonchi.  ABD: Normal bowel sounds; soft; non-distended; non-tender; no hepatosplenomegaly.  EXT: Normal ROM. No clubbing, cyanosis or edema.  NEURO: Alert, oriented. Grossly unremarkable.  PSYCH: Cooperative, appropriate. VITAL SIGNS: I have reviewed nursing notes and confirm.  CONSTITUTIONAL: Well-developed; well-nourished; in no acute distress.  SKIN: See extremity exam.   HEAD: Normocephalic; atraumatic.  EYES: PERRL, EOM intact; conjunctiva and sclera clear.  ENT: No nasal discharge; airway clear.  NECK: Supple; non tender.  CARD: S1, S2 normal; no murmurs, gallops, or rubs. Regular rate and rhythm.  RESP: No wheezes, rales or rhonchi.  ABD: Normal bowel sounds; soft; non-distended; non-tender; no hepatosplenomegaly.  LUE: forearm with circumferential cellulitis spanning from immediately below the elbow to the MCPs of all fingers, too numerous to count, <1 cm in diameter non-draining abscesses to posterior forearm and posterior hand, limited ROM of all 5 digits at MCPs and PIPs but non-tender, forearm and hand are non-tender, no crepitus, sensation intact, fingertips are pink and warm with <2 second capillary refill to the fingertips, 2+ pulses.  RUE: Circumferential area of cellulitis spanning from immediately inferior to the elbow to the midforearm with 2 2cm abscesses to the posterior arm and multiple <0.5cm abscesses to the anterior forearm with + fluctuance but no active drainage and no crepitus. Full ROM at the elbow and wrist, 5/5 hand , 5/5 strength at the wrist and elbow, 2+ pulses, sensation intact, capillary refill <2 seconds.  NEURO: Alert, oriented. Grossly unremarkable.  PSYCH: Cooperative, appropriate.

## 2019-02-01 NOTE — ED PROVIDER NOTE - ATTENDING CONTRIBUTION TO CARE
Patient seen with SHEY Sexton with bilateral hand cellulitis after injecting drugs, patient left ED against medical advice and requires IV antibiotics and hand surgery consult.  Patient stated he needed to take care of his dog and would consider returning to ED after.  D/w him possibility of sepsis and loss of life or limb and he expressed understanding of the seriousness of his condition.

## 2019-02-01 NOTE — ED PROVIDER NOTE - OBJECTIVE STATEMENT
35 y/o male                               PMHx: IV drug abuse (cocaine)    Patient presents to ED c/o bilateral hand pain. Patient states he was 10 months sober but admits he injected cocaine intravenously in his arms 5 days ago. He now reports pain in the bilateral hands, pain in left hand greater than right, and is unable to fully close his left hand due to pain. He also reports redness and abscesses on his bilateral hands and forearms. Denies any fever, chills, numbness to the hands, or current foreign body in the hands (no needles). Admits that he shares his needles with his wife, but reports she has been tested and is clean. Patient is allergic to Bactrim and Clindamycin.

## 2019-02-01 NOTE — ED PROVIDER NOTE - NSFOLLOWUPINSTRUCTIONS_ED_ALL_ED_FT
Please return here or to any emergency department to complete your care.  You have a skin infection of your hands which is serious and could result in loss of function or loss of your hand.

## 2019-02-01 NOTE — ED PROVIDER NOTE - PROGRESS NOTE DETAILS
The patient wishes to leave against medical advice.  I have discussed the risks, benefits and alternatives (including the possibility of worsening of disease, pain, permanent disability, and/or death) with the patient and his/her family (if available).  The patient voices understanding of these risks, benefits, and alternatives and still wishes to sign out against medical advice.  The patient is awake, alert, oriented  x 3 and has demonstrated capacity to refuse/direct care.  I have advised the patient that they can and should return immediately should they develop any worse/different/additional symptoms, or if they change their mind and want to continue their care. Pt refused to sign AMA form

## 2019-02-01 NOTE — ED ADULT TRIAGE NOTE - CHIEF COMPLAINT QUOTE
c/o bilateral hand pain from injecting cocaine c/o bilateral hand pain from injecting cocaine, Multiple wounds of hand and forearms noted

## 2019-02-01 NOTE — ED PROVIDER NOTE - MEDICAL DECISION MAKING DETAILS
Patient presents to ED c/o bilateral hand pain. Patient states he was 10 months sober but admits he injected cocaine intravenously in his arms 5 days ago. He now reports pain in the bilateral hands, pain in left hand greater than right, and is unable to fully close his left hand due to pain. He also reports redness and abscesses on his bilateral hands and forearms. Denies any fever, chills, numbness to the hands, or current foreign body in the hands (no needles). Admits that he shares his needles with his wife, but reports she has been tested and is clean. Patient is allergic to Bactrim and Clindamycin.  Patient states he has to leave and take care of his dog, states he will come back.  Patient will leave ED against medical advice, since he requires IV antibiotics for his cellulitis.

## 2019-02-02 ENCOUNTER — INPATIENT (INPATIENT)
Facility: HOSPITAL | Age: 34
LOS: 0 days | Discharge: LEFT BEFORE DISCHARGE | DRG: 580 | End: 2019-02-03
Attending: HOSPITALIST | Admitting: HOSPITALIST
Payer: COMMERCIAL

## 2019-02-02 VITALS
DIASTOLIC BLOOD PRESSURE: 83 MMHG | OXYGEN SATURATION: 98 % | HEART RATE: 122 BPM | RESPIRATION RATE: 18 BRPM | SYSTOLIC BLOOD PRESSURE: 139 MMHG | TEMPERATURE: 98 F | WEIGHT: 229.94 LBS

## 2019-02-02 LAB
ALBUMIN SERPL ELPH-MCNC: 3.8 G/DL — SIGNIFICANT CHANGE UP (ref 3.3–5)
ALP SERPL-CCNC: 68 U/L — SIGNIFICANT CHANGE UP (ref 40–120)
ALT FLD-CCNC: 14 U/L — SIGNIFICANT CHANGE UP (ref 10–45)
ANION GAP SERPL CALC-SCNC: 15 MMOL/L — SIGNIFICANT CHANGE UP (ref 5–17)
APPEARANCE UR: CLEAR — SIGNIFICANT CHANGE UP
AST SERPL-CCNC: 25 U/L — SIGNIFICANT CHANGE UP (ref 10–40)
BASOPHILS NFR BLD AUTO: 0.1 % — SIGNIFICANT CHANGE UP (ref 0–2)
BILIRUB SERPL-MCNC: 0.4 MG/DL — SIGNIFICANT CHANGE UP (ref 0.2–1.2)
BILIRUB UR-MCNC: ABNORMAL
BUN SERPL-MCNC: 7 MG/DL — SIGNIFICANT CHANGE UP (ref 7–23)
CALCIUM SERPL-MCNC: 9 MG/DL — SIGNIFICANT CHANGE UP (ref 8.4–10.5)
CHLORIDE SERPL-SCNC: 102 MMOL/L — SIGNIFICANT CHANGE UP (ref 96–108)
CO2 SERPL-SCNC: 20 MMOL/L — LOW (ref 22–31)
COLOR SPEC: YELLOW — SIGNIFICANT CHANGE UP
CREAT SERPL-MCNC: 0.83 MG/DL — SIGNIFICANT CHANGE UP (ref 0.5–1.3)
DIFF PNL FLD: NEGATIVE — SIGNIFICANT CHANGE UP
GLUCOSE SERPL-MCNC: 111 MG/DL — HIGH (ref 70–99)
GLUCOSE UR QL: NEGATIVE — SIGNIFICANT CHANGE UP
HCT VFR BLD CALC: 36.9 % — LOW (ref 39–50)
HGB BLD-MCNC: 12.4 G/DL — LOW (ref 13–17)
KETONES UR-MCNC: >=80 MG/DL
LACTATE SERPL-SCNC: 1.1 MMOL/L — SIGNIFICANT CHANGE UP (ref 0.5–2)
LEUKOCYTE ESTERASE UR-ACNC: NEGATIVE — SIGNIFICANT CHANGE UP
LYMPHOCYTES # BLD AUTO: 10.3 % — LOW (ref 13–44)
MCHC RBC-ENTMCNC: 26.4 PG — LOW (ref 27–34)
MCHC RBC-ENTMCNC: 33.6 G/DL — SIGNIFICANT CHANGE UP (ref 32–36)
MCV RBC AUTO: 78.7 FL — LOW (ref 80–100)
MONOCYTES NFR BLD AUTO: 6.8 % — SIGNIFICANT CHANGE UP (ref 2–14)
NEUTROPHILS NFR BLD AUTO: 82.8 % — HIGH (ref 43–77)
NITRITE UR-MCNC: NEGATIVE — SIGNIFICANT CHANGE UP
PCP SPEC-MCNC: SIGNIFICANT CHANGE UP
PH UR: 6 — SIGNIFICANT CHANGE UP (ref 5–8)
PLATELET # BLD AUTO: 285 K/UL — SIGNIFICANT CHANGE UP (ref 150–400)
POTASSIUM SERPL-MCNC: 4.1 MMOL/L — SIGNIFICANT CHANGE UP (ref 3.5–5.3)
POTASSIUM SERPL-SCNC: 4.1 MMOL/L — SIGNIFICANT CHANGE UP (ref 3.5–5.3)
PROT SERPL-MCNC: 7.3 G/DL — SIGNIFICANT CHANGE UP (ref 6–8.3)
PROT UR-MCNC: ABNORMAL MG/DL
RBC # BLD: 4.69 M/UL — SIGNIFICANT CHANGE UP (ref 4.2–5.8)
RBC # FLD: 12.5 % — SIGNIFICANT CHANGE UP (ref 10.3–16.9)
SODIUM SERPL-SCNC: 137 MMOL/L — SIGNIFICANT CHANGE UP (ref 135–145)
SP GR SPEC: 1.02 — SIGNIFICANT CHANGE UP (ref 1–1.03)
TROPONIN T SERPL-MCNC: <0.01 NG/ML — SIGNIFICANT CHANGE UP (ref 0–0.01)
UROBILINOGEN FLD QL: 0.2 E.U./DL — SIGNIFICANT CHANGE UP
WBC # BLD: 9.1 K/UL — SIGNIFICANT CHANGE UP (ref 3.8–10.5)
WBC # FLD AUTO: 9.1 K/UL — SIGNIFICANT CHANGE UP (ref 3.8–10.5)

## 2019-02-02 PROCEDURE — 84484 ASSAY OF TROPONIN QUANT: CPT

## 2019-02-02 PROCEDURE — 87040 BLOOD CULTURE FOR BACTERIA: CPT

## 2019-02-02 PROCEDURE — 80307 DRUG TEST PRSMV CHEM ANLYZR: CPT

## 2019-02-02 PROCEDURE — 99285 EMERGENCY DEPT VISIT HI MDM: CPT | Mod: 25

## 2019-02-02 PROCEDURE — 96365 THER/PROPH/DIAG IV INF INIT: CPT | Mod: XU

## 2019-02-02 PROCEDURE — 87205 SMEAR GRAM STAIN: CPT

## 2019-02-02 PROCEDURE — 96367 TX/PROPH/DG ADDL SEQ IV INF: CPT | Mod: XU

## 2019-02-02 PROCEDURE — 85025 COMPLETE CBC W/AUTO DIFF WBC: CPT

## 2019-02-02 PROCEDURE — 10061 I&D ABSCESS COMP/MULTIPLE: CPT | Mod: XU

## 2019-02-02 PROCEDURE — 99223 1ST HOSP IP/OBS HIGH 75: CPT | Mod: GC

## 2019-02-02 PROCEDURE — 83605 ASSAY OF LACTIC ACID: CPT

## 2019-02-02 PROCEDURE — 96375 TX/PRO/DX INJ NEW DRUG ADDON: CPT | Mod: XU

## 2019-02-02 PROCEDURE — 36415 COLL VENOUS BLD VENIPUNCTURE: CPT

## 2019-02-02 PROCEDURE — 82550 ASSAY OF CK (CPK): CPT

## 2019-02-02 PROCEDURE — 93005 ELECTROCARDIOGRAM TRACING: CPT | Mod: XU

## 2019-02-02 PROCEDURE — 81001 URINALYSIS AUTO W/SCOPE: CPT

## 2019-02-02 PROCEDURE — 80053 COMPREHEN METABOLIC PANEL: CPT

## 2019-02-02 PROCEDURE — 93010 ELECTROCARDIOGRAM REPORT: CPT

## 2019-02-02 PROCEDURE — 87070 CULTURE OTHR SPECIMN AEROBIC: CPT

## 2019-02-02 RX ORDER — VANCOMYCIN HCL 1 G
2000 VIAL (EA) INTRAVENOUS ONCE
Qty: 0 | Refills: 0 | Status: DISCONTINUED | OUTPATIENT
Start: 2019-02-02 | End: 2019-02-02

## 2019-02-02 RX ORDER — PIPERACILLIN AND TAZOBACTAM 4; .5 G/20ML; G/20ML
3.38 INJECTION, POWDER, LYOPHILIZED, FOR SOLUTION INTRAVENOUS ONCE
Qty: 0 | Refills: 0 | Status: COMPLETED | OUTPATIENT
Start: 2019-02-02 | End: 2019-02-02

## 2019-02-02 RX ORDER — VANCOMYCIN HCL 1 G
1500 VIAL (EA) INTRAVENOUS ONCE
Qty: 0 | Refills: 0 | Status: COMPLETED | OUTPATIENT
Start: 2019-02-02 | End: 2019-02-02

## 2019-02-02 RX ORDER — HYDROMORPHONE HYDROCHLORIDE 2 MG/ML
1 INJECTION INTRAMUSCULAR; INTRAVENOUS; SUBCUTANEOUS ONCE
Qty: 0 | Refills: 0 | Status: DISCONTINUED | OUTPATIENT
Start: 2019-02-02 | End: 2019-02-02

## 2019-02-02 RX ORDER — ACETAMINOPHEN 500 MG
1000 TABLET ORAL ONCE
Qty: 0 | Refills: 0 | Status: COMPLETED | OUTPATIENT
Start: 2019-02-02 | End: 2019-02-02

## 2019-02-02 RX ORDER — SODIUM CHLORIDE 9 MG/ML
1000 INJECTION INTRAMUSCULAR; INTRAVENOUS; SUBCUTANEOUS ONCE
Qty: 0 | Refills: 0 | Status: COMPLETED | OUTPATIENT
Start: 2019-02-02 | End: 2019-02-02

## 2019-02-02 RX ADMIN — PIPERACILLIN AND TAZOBACTAM 3.38 GRAM(S): 4; .5 INJECTION, POWDER, LYOPHILIZED, FOR SOLUTION INTRAVENOUS at 21:19

## 2019-02-02 RX ADMIN — Medication 1000 MILLIGRAM(S): at 21:35

## 2019-02-02 RX ADMIN — SODIUM CHLORIDE 1000 MILLILITER(S): 9 INJECTION INTRAMUSCULAR; INTRAVENOUS; SUBCUTANEOUS at 19:59

## 2019-02-02 RX ADMIN — HYDROMORPHONE HYDROCHLORIDE 1 MILLIGRAM(S): 2 INJECTION INTRAMUSCULAR; INTRAVENOUS; SUBCUTANEOUS at 21:18

## 2019-02-02 RX ADMIN — PIPERACILLIN AND TAZOBACTAM 200 GRAM(S): 4; .5 INJECTION, POWDER, LYOPHILIZED, FOR SOLUTION INTRAVENOUS at 19:55

## 2019-02-02 RX ADMIN — Medication 300 MILLIGRAM(S): at 22:10

## 2019-02-02 RX ADMIN — Medication 400 MILLIGRAM(S): at 21:20

## 2019-02-02 RX ADMIN — HYDROMORPHONE HYDROCHLORIDE 1 MILLIGRAM(S): 2 INJECTION INTRAMUSCULAR; INTRAVENOUS; SUBCUTANEOUS at 21:35

## 2019-02-02 RX ADMIN — Medication 1000 MILLIGRAM(S): at 21:34

## 2019-02-02 RX ADMIN — SODIUM CHLORIDE 1000 MILLILITER(S): 9 INJECTION INTRAMUSCULAR; INTRAVENOUS; SUBCUTANEOUS at 21:20

## 2019-02-02 NOTE — CONSULT NOTE ADULT - SUBJECTIVE AND OBJECTIVE BOX
CC: LEFT hand cellulitis with abscess, LEFT forearm cellulitis with abscess    HPI:The patient was seen at [facility] for admission for left hand abscess where drainage procedure was begun. The patient signed out AMA. The patient now states that he has pain in his left forearm. The patient has been using IV coaine in primarily his left forearm with xanax- approximately 8mg-14mg "multiple times a day".    PMH negative  IV drug abuse.    PSH: cellulitis    Allergies: clindamycin> hives    Home Medications:   * Outpatient Medication Status not yet specified    ED records reviewed  Discussed with ED staff    REVIEW OF SYSTEMS:    all other ROS negative except as per HPI      PHYSICAL EXAM:   system specific  LEFT dorsal hand  2 separate 2 cm diameter fluctuant pustules  1 at II web space and second over fourth web space  pink erythema  moderate tenderness  good digit ROM    LEFT posterior forearm  5 cm diameter raised pustule  epidermolysis distal, 4 x 2 cm  angry red erythema  moderate tenderness    moderate arm edema

## 2019-02-02 NOTE — ED ADULT NURSE NOTE - OBJECTIVE STATEMENT
Patient is a 35yo male reporting pain, redness, and swelling of left hand and arm s/p cocaine injection a couple hours ago. Patient reports he relapsed five days ago and has been injecting with multiple sites becoming swollen and painful. Denies any other drug use, alcohol use, chest pain, shortness of breath, fevers.

## 2019-02-02 NOTE — ED ADULT NURSE REASSESSMENT NOTE - NS ED NURSE REASSESS COMMENT FT1
vancomycin still in the main pharm for  formulation as per [facility], pt also refused to hooked to cardiac monitor

## 2019-02-02 NOTE — ED PROVIDER NOTE - PROGRESS NOTE DETAILS
i&d performed at bedside by Dr. Salas. Dr. Salas does not believe CT necessitated at this time. Plan is to discussed hospitalization with patient who is agreeable to plan. states that he does not feel as if he is in withdrawal- discussed case with internal medicine who is aware of patient's recent use of cocoain and benzo, recommend continued monitoring

## 2019-02-02 NOTE — ED PROVIDER NOTE - PHYSICAL EXAMINATION
General: Patient is well developed and well nourised. Patient is alert and oriented to person, place and date. Patient is laying comfortably in stretcher and appears in no acute distress.  HEENT: Head is normocephalic and atraumatic. Pupils are equal, round and reactive. Extraocular movements intact. No evidence of nystagmus, conjunctival injection, or scleral icterus. External ears symmetric without evidence of discharge.  Nose is symmetric, non-tender, patent without evidence of discharge. Teeth in good repair. Uvula midline.   Neck: Supple with no evidence of lymphadenopathy.  Full range of motion.  Heart: Regular rate and rhythm. No murmurs, rubs or gallops.   Lungs: Clear to auscultation bilaterally with equal chest expansion. No note of wheezes, rhonchi, rales. Equal chest expansion. No note of retractions.  Abdomen: Bowel sounds present in all four quadrants. Soft, non-tender, non-distended without signs of masses, rebound or guarding. No note of hepatosplenomegaly. No CVA tenderness bilaterally. Negative Hackett sign. No pain present over McBurney's point.  Musculoskeletal:decreased ROM of the left hand 2/2 pain, full ROM left elbow..  No clubbing or cyanosis. No point tenderness to palpation.   Neuro: Cranial nerves II-XII intact. GCS 15. Moving all extremities without discomfort. Strength is 5/5 arms and legs bilaterally. Sensation intact in all four extremities. gait steady   Skin: left forearm, voral/latereal side, hardness and induration, no definitive evidence of abscess. left mid dorsum of hand, area of 2x2cm harness and induration. eschar present distal right radius, no evidence of cellulitis. Warm, dry and intact without evidence of rashes, bruising, pallor, jaundice or cyanosis.   Psych: Mood and affect appropriate.

## 2019-02-02 NOTE — ED PROVIDER NOTE - ATTENDING CONTRIBUTION TO CARE
33 y/o m with IVDA presents with left arm swelling and redness  States that he was seen at [facility] for admission for left hand abscess, states that he signed out AMA because there "was a 16 hour wait". States that he has pain in his left forearm. States that over the past 8 days he relapsed. Patient has been using IV coaine in primarily his left forearm with xanax- approximately 8mg-14mg "multiple times a day". states that he is unsure why he relapsed, states he has not been experiencing stressors at home, and has familial support. States that this has never happened to him before, no history of hospitalization or history of I&d. States that he uses clean needles. No other medical history. Does not endorse chest pain, palpitations, cough, sob or abdominal pain. states that he has pain in his left forearm and unable to completely close the hand due to pain. patient is right hand dominant.    PE - nad  Left forearm- large area of fluctuance on lateral forearm  with diffuse swelling, multiple areas of erythema and infection, noncircumferential, distal pulses intact, sensation intact, no evidence of cpt syndrome, right upper ext - also with smaller areas of infection and erythema.      Plastics hand called to see pt and I&D performed  Pt to be admitted to medicine for iv antibiotics.

## 2019-02-02 NOTE — ED PROVIDER NOTE - MEDICAL DECISION MAKING DETAILS
34 year old male, pmhx 10 year sobriety from IV cocaine use, presents to the ed with left oleg-lateral forearm pain/swelling over the past 8 days after relapse. physical exam, patient apepars well, non-toxic. heart rate 1-teens. ABx, labs and fluids ordered. Dr. Salas, plastics, to presents to ed to dick. 34 year old male, pmhx 10 year sobriety from IV cocaine use, presents to the ed with left oleg-lateral forearm pain/swelling over the past 8 days after relapse. physical exam, patient apepars well, non-toxic. heart rate 1-teens. ABx, labs and fluids ordered. Dr. Salas, plastics, to presents to ed to eval who performed I and d at bedside. plan is to admit patient for furthering evaluation and IV abx. patietn is agreeable to plan.

## 2019-02-02 NOTE — ED PROVIDER NOTE - OBJECTIVE STATEMENT
States that he was seen at [facility] for admission for left hand abscess, states that he signed out AMA because there "was a 16 hour wait". States that he has pain in his left forearm. States that over the past 8 days he relapsed. Patient has been using IV coaine in primarily his left forearm with xanax- approximately 8mg-14mg "multiple times a day". states that he is unsure why he relapsed, states he has not been experiencing stressors at home, and has familial support. States that this has never happened to him before, no history of hospitalization or history of I&d. States that he uses clean needles. No other medical history. Does not endorse chest pain, palpitations, cough, sob or abdominal pain. states that he has pain in his left forearm and unable to completely close the hand due to pain. patient is right hand dominant.

## 2019-02-02 NOTE — ED PROVIDER NOTE - CHIEF COMPLAINT
The patient is a 34y Male history of 10 year sobriety from IV cocaine use complaining of medical evaluation.

## 2019-02-02 NOTE — ED ADULT NURSE NOTE - CHIEF COMPLAINT QUOTE
Patient with PMHx of IV drug use, relapsed five days ago with cocaine, last use one hour PTA, complaining of pain to wound in left hand.  Patient states site was of injection five days ago.  Patient denies any CP, SOB, dizziness, N/V, abdominal pain or any other complaints at this time.  EKG in progress.

## 2019-02-02 NOTE — ED ADULT NURSE NOTE - NSIMPLEMENTINTERV_GEN_ALL_ED
Implemented All Universal Safety Interventions:  [place] to call system. Call bell, personal items and telephone within reach. Instruct patient to call for assistance. Room bathroom lighting operational. Non-slip footwear when patient is off stretcher. Physically safe environment: no spills, clutter or unnecessary equipment. Stretcher in lowest position, wheels locked, appropriate side rails in place.

## 2019-02-02 NOTE — CONSULT NOTE ADULT - ASSESSMENT
IMP-  2 dorsal hand deep pustules with cellulitis  dorsal forearm deep pustule with cellulitis    PLAN-  I&D LEFT dorsal hand  I&D Left dorsal forearm  culture sent    admit to medicine  IV antibiotics

## 2019-02-03 ENCOUNTER — INPATIENT (INPATIENT)
Facility: HOSPITAL | Age: 34
LOS: 0 days | Discharge: LEFT BEFORE DISCHARGE | DRG: 603 | End: 2019-02-04
Attending: STUDENT IN AN ORGANIZED HEALTH CARE EDUCATION/TRAINING PROGRAM | Admitting: STUDENT IN AN ORGANIZED HEALTH CARE EDUCATION/TRAINING PROGRAM
Payer: COMMERCIAL

## 2019-02-03 VITALS
DIASTOLIC BLOOD PRESSURE: 93 MMHG | SYSTOLIC BLOOD PRESSURE: 178 MMHG | HEIGHT: 75 IN | OXYGEN SATURATION: 97 % | WEIGHT: 229.94 LBS | HEART RATE: 110 BPM | TEMPERATURE: 98 F | RESPIRATION RATE: 18 BRPM

## 2019-02-03 VITALS
SYSTOLIC BLOOD PRESSURE: 134 MMHG | HEART RATE: 97 BPM | DIASTOLIC BLOOD PRESSURE: 70 MMHG | TEMPERATURE: 99 F | RESPIRATION RATE: 18 BRPM

## 2019-02-03 VITALS
OXYGEN SATURATION: 95 % | RESPIRATION RATE: 17 BRPM | SYSTOLIC BLOOD PRESSURE: 137 MMHG | TEMPERATURE: 98 F | DIASTOLIC BLOOD PRESSURE: 76 MMHG | HEART RATE: 95 BPM

## 2019-02-03 DIAGNOSIS — R94.31 ABNORMAL ELECTROCARDIOGRAM [ECG] [EKG]: ICD-10-CM

## 2019-02-03 DIAGNOSIS — Z91.89 OTHER SPECIFIED PERSONAL RISK FACTORS, NOT ELSEWHERE CLASSIFIED: ICD-10-CM

## 2019-02-03 DIAGNOSIS — L03.90 CELLULITIS, UNSPECIFIED: ICD-10-CM

## 2019-02-03 DIAGNOSIS — Z29.9 ENCOUNTER FOR PROPHYLACTIC MEASURES, UNSPECIFIED: ICD-10-CM

## 2019-02-03 DIAGNOSIS — F14.10 COCAINE ABUSE, UNCOMPLICATED: ICD-10-CM

## 2019-02-03 DIAGNOSIS — R63.8 OTHER SYMPTOMS AND SIGNS CONCERNING FOOD AND FLUID INTAKE: ICD-10-CM

## 2019-02-03 DIAGNOSIS — F19.10 OTHER PSYCHOACTIVE SUBSTANCE ABUSE, UNCOMPLICATED: ICD-10-CM

## 2019-02-03 DIAGNOSIS — R00.0 TACHYCARDIA, UNSPECIFIED: ICD-10-CM

## 2019-02-03 DIAGNOSIS — F13.10 SEDATIVE, HYPNOTIC OR ANXIOLYTIC ABUSE, UNCOMPLICATED: ICD-10-CM

## 2019-02-03 DIAGNOSIS — F11.90 OPIOID USE, UNSPECIFIED, UNCOMPLICATED: ICD-10-CM

## 2019-02-03 DIAGNOSIS — D64.9 ANEMIA, UNSPECIFIED: ICD-10-CM

## 2019-02-03 LAB
ANION GAP SERPL CALC-SCNC: 13 MMOL/L — SIGNIFICANT CHANGE UP (ref 5–17)
BASOPHILS NFR BLD AUTO: 0.1 % — SIGNIFICANT CHANGE UP (ref 0–2)
BUN SERPL-MCNC: 8 MG/DL — SIGNIFICANT CHANGE UP (ref 7–23)
CALCIUM SERPL-MCNC: 9.2 MG/DL — SIGNIFICANT CHANGE UP (ref 8.4–10.5)
CHLORIDE SERPL-SCNC: 102 MMOL/L — SIGNIFICANT CHANGE UP (ref 96–108)
CO2 SERPL-SCNC: 22 MMOL/L — SIGNIFICANT CHANGE UP (ref 22–31)
CREAT SERPL-MCNC: 1 MG/DL — SIGNIFICANT CHANGE UP (ref 0.5–1.3)
EOSINOPHIL NFR BLD AUTO: 0.1 % — SIGNIFICANT CHANGE UP (ref 0–6)
GLUCOSE SERPL-MCNC: 131 MG/DL — HIGH (ref 70–99)
HAV IGM SER-ACNC: SIGNIFICANT CHANGE UP
HBV CORE IGM SER-ACNC: SIGNIFICANT CHANGE UP
HBV SURFACE AG SER-ACNC: SIGNIFICANT CHANGE UP
HCT VFR BLD CALC: 35.8 % — LOW (ref 39–50)
HCV AB S/CO SERPL IA: 0.05 S/CO — SIGNIFICANT CHANGE UP
HCV AB SERPL-IMP: SIGNIFICANT CHANGE UP
HGB BLD-MCNC: 12.2 G/DL — LOW (ref 13–17)
HIV 1+2 AB+HIV1 P24 AG SERPL QL IA: SIGNIFICANT CHANGE UP
LACTATE SERPL-SCNC: 1 MMOL/L — SIGNIFICANT CHANGE UP (ref 0.5–2)
LYMPHOCYTES # BLD AUTO: 14.9 % — SIGNIFICANT CHANGE UP (ref 13–44)
MCHC RBC-ENTMCNC: 26.8 PG — LOW (ref 27–34)
MCHC RBC-ENTMCNC: 34.1 G/DL — SIGNIFICANT CHANGE UP (ref 32–36)
MCV RBC AUTO: 78.5 FL — LOW (ref 80–100)
MONOCYTES NFR BLD AUTO: 8.7 % — SIGNIFICANT CHANGE UP (ref 2–14)
NEUTROPHILS NFR BLD AUTO: 76.2 % — SIGNIFICANT CHANGE UP (ref 43–77)
PLATELET # BLD AUTO: 346 K/UL — SIGNIFICANT CHANGE UP (ref 150–400)
POTASSIUM SERPL-MCNC: 4 MMOL/L — SIGNIFICANT CHANGE UP (ref 3.5–5.3)
POTASSIUM SERPL-SCNC: 4 MMOL/L — SIGNIFICANT CHANGE UP (ref 3.5–5.3)
RBC # BLD: 4.56 M/UL — SIGNIFICANT CHANGE UP (ref 4.2–5.8)
RBC # FLD: 12.6 % — SIGNIFICANT CHANGE UP (ref 10.3–16.9)
SODIUM SERPL-SCNC: 137 MMOL/L — SIGNIFICANT CHANGE UP (ref 135–145)
WBC # BLD: 9.9 K/UL — SIGNIFICANT CHANGE UP (ref 3.8–10.5)
WBC # FLD AUTO: 9.9 K/UL — SIGNIFICANT CHANGE UP (ref 3.8–10.5)

## 2019-02-03 PROCEDURE — 83605 ASSAY OF LACTIC ACID: CPT

## 2019-02-03 PROCEDURE — 99285 EMERGENCY DEPT VISIT HI MDM: CPT | Mod: 25

## 2019-02-03 PROCEDURE — 99223 1ST HOSP IP/OBS HIGH 75: CPT | Mod: GC

## 2019-02-03 PROCEDURE — 99285 EMERGENCY DEPT VISIT HI MDM: CPT

## 2019-02-03 PROCEDURE — 85025 COMPLETE CBC W/AUTO DIFF WBC: CPT

## 2019-02-03 PROCEDURE — 80048 BASIC METABOLIC PNL TOTAL CA: CPT

## 2019-02-03 PROCEDURE — 36415 COLL VENOUS BLD VENIPUNCTURE: CPT

## 2019-02-03 PROCEDURE — 87389 HIV-1 AG W/HIV-1&-2 AB AG IA: CPT

## 2019-02-03 PROCEDURE — 80074 ACUTE HEPATITIS PANEL: CPT

## 2019-02-03 RX ORDER — VANCOMYCIN HCL 1 G
1500 VIAL (EA) INTRAVENOUS EVERY 12 HOURS
Qty: 0 | Refills: 0 | Status: COMPLETED | OUTPATIENT
Start: 2019-02-03 | End: 2019-02-03

## 2019-02-03 RX ORDER — INFLUENZA VIRUS VACCINE 15; 15; 15; 15 UG/.5ML; UG/.5ML; UG/.5ML; UG/.5ML
0.5 SUSPENSION INTRAMUSCULAR ONCE
Qty: 0 | Refills: 0 | Status: COMPLETED | OUTPATIENT
Start: 2019-02-03 | End: 2019-02-03

## 2019-02-03 RX ORDER — ACETAMINOPHEN 500 MG
650 TABLET ORAL EVERY 6 HOURS
Qty: 0 | Refills: 0 | Status: DISCONTINUED | OUTPATIENT
Start: 2019-02-03 | End: 2019-02-04

## 2019-02-03 RX ORDER — VANCOMYCIN HCL 1 G
1750 VIAL (EA) INTRAVENOUS EVERY 12 HOURS
Qty: 0 | Refills: 0 | Status: DISCONTINUED | OUTPATIENT
Start: 2019-02-03 | End: 2019-02-03

## 2019-02-03 RX ORDER — SODIUM CHLORIDE 9 MG/ML
1000 INJECTION INTRAMUSCULAR; INTRAVENOUS; SUBCUTANEOUS ONCE
Qty: 0 | Refills: 0 | Status: COMPLETED | OUTPATIENT
Start: 2019-02-03 | End: 2019-02-03

## 2019-02-03 RX ADMIN — SODIUM CHLORIDE 1000 MILLILITER(S): 9 INJECTION INTRAMUSCULAR; INTRAVENOUS; SUBCUTANEOUS at 08:49

## 2019-02-03 RX ADMIN — Medication 300 MILLIGRAM(S): at 11:45

## 2019-02-03 RX ADMIN — Medication 300 MILLIGRAM(S): at 23:49

## 2019-02-03 NOTE — PROGRESS NOTE ADULT - SUBJECTIVE AND OBJECTIVE BOX
ORI MILLER  [id number]    Subjective:  Patient is a 34y old  Male who presents with a chief complaint of L ARM CELLULITIS (03 Feb 2019 07:51), no acute events overnight, patient states that the pain is improved       Objective:  T(C): 37 (02-03-19 @ 09:33), Max: 37.4 (02-03-19 @ 01:12)  HR: 97 (02-03-19 @ 09:33) (97 - 123)  BP: 134/70 (02-03-19 @ 09:33) (133/78 - 178/93)  RR: 18 (02-03-19 @ 09:33) (17 - 18)  SpO2: 97% (02-03-19 @ 08:38) (97% - 100%)  Wt(kg): --   02-03    137  |  102  |  8   ----------------------------<  131<H>  4.0   |  22  |  1.00    Ca    9.2      03 Feb 2019 08:17    TPro  7.3  /  Alb  3.8  /  TBili  0.4  /  DBili  x   /  AST  25  /  ALT  14  /  AlkPhos  68  02-02                        12.2   9.9   )-----------( 346      ( 03 Feb 2019 08:17 )             35.8       PHYSICAL EXAM:    General: NAD, resting comfortably in bed  MSK: Dressing taken down, erythema in hand improved, continued erythema in forearm which is improved. Induration present, no purulence able to be expressed      MEDICATIONS  (STANDING):  vancomycin  IVPB 1500 milliGRAM(s) IV Intermittent every 12 hours    MEDICATIONS  (PRN):  acetaminophen   Tablet .. 650 milliGRAM(s) Oral every 6 hours PRN Temp greater or equal to 38C (100.4F), Moderate Pain (4 - 6)

## 2019-02-03 NOTE — H&P ADULT - PROBLEM SELECTOR PLAN 10
1) PCP Contacted on Admission: (Y/N) --> Name & Phone #: None  2) Date of Contact with PCP:  3) PCP Contacted at Discharge: (Y/N)  4) Summary of Handoff Given to PCP:   5) Post-Discharge Appointment Date and Location:

## 2019-02-03 NOTE — H&P ADULT - NSHPPHYSICALEXAM_GEN_ALL_CORE
VITAL SIGNS:  T(F): 99.4 (02-03-19 @ 01:12)  HR: 103 (02-03-19 @ 01:12)  BP: 133/78 (02-03-19 @ 01:12)  RR: 17 (02-03-19 @ 01:12)  SpO2: 99% (02-03-19 @ 01:12)  Wt(kg): --    PHYSICAL EXAM:    Constitutional: well-appearing, well-developed, NAD  Eyes: mildly dilated pupils, b/l reactive to light, EOMI, no nystagmus  ENMT: MMM, no oral lesions  Neck: supple  Respiratory: CTAb/l, no wheezes/rales/rhonchi  Cardiovascular: regular, tachycardic to 100s, normal S1/S2, no murmurs/rubs/gallops  Gastrointestinal: soft, NTND, BS normal  Extremities: b/l LUE and RUE track marks with points of swelling, 2 lesions on right forearm with tenderness to palpation (superior one with small amount of purulent drainage and inferior one with necrotic tissue and tenderness/fluctuance to palpation). Left arm wrapped/packed with some bloody drainage. No edema or cyanosis. Sensation intact b/l. No joint swelling  Vascular: radial and DP 2+ b/l  Neurological: AAOx3, CNII-XII grossly intact, moving all extremities  Musculoskeletal: no joint swelling  Skin: no rashes VITAL SIGNS:  T(F): 99.4 (02-03-19 @ 01:12)  HR: 103 (02-03-19 @ 01:12)  BP: 133/78 (02-03-19 @ 01:12)  RR: 17 (02-03-19 @ 01:12)  SpO2: 99% (02-03-19 @ 01:12)  Wt(kg): --    PHYSICAL EXAM:    Constitutional: well-appearing, well-developed, NAD  Eyes: mildly dilated pupils, b/l reactive to light, EOMI, no nystagmus  ENMT: MMM, no oral lesions  Neck: supple  Respiratory: CTAb/l, no wheezes/rales/rhonchi  Cardiovascular: regular, tachycardic to 100s, normal S1/S2, no murmurs/rubs/gallops  Gastrointestinal: soft, NTND, BS normal  Extremities: b/l LUE and RUE track marks with points of swelling, 2 lesions on right forearm with tenderness to palpation (superior one with small amount of purulent drainage and inferior one with necrotic tissue and tenderness/fluctuance to palpation). Left arm wrapped/packed with some bloody drainage. No edema or cyanosis. Sensation intact b/l. No joint swelling  Vascular: radial and DP 2+ b/l  Neurological: AAOx3, CNII-XII grossly intact, moving all extremities  Musculoskeletal: no joint swelling  Skin: no rashes  Psych: normal affect

## 2019-02-03 NOTE — DISCHARGE NOTE ADULT - CARE PLAN
Principal Discharge DX:	Cellulitis and abscess  Goal:	IV antibiotics and I&D  Assessment and plan of treatment:	Patient left AMA

## 2019-02-03 NOTE — PROGRESS NOTE ADULT - ATTENDING COMMENTS
S- patient feeling better    O-  LT dorsal hand-  minimal erythema  minimal tenderness    LT dorsal forearm-  angry red erythema persists but slightly resolving  edema sightly improved  tenderness persists    A- slight improvement overall    Plan/Rec- cont IV antibiotics  begin tid irrigations to LT dorsal forearm  culture results when availble

## 2019-02-03 NOTE — H&P ADULT - PROBLEM SELECTOR PLAN 5
1) PCP Contacted on Admission: (Y/N) --> Name & Phone #: NO PCP  2) Date of Contact with PCP:  3) PCP Contacted at Discharge: (Y/N)  4) Summary of Handoff Given to PCP:   5) Post-Discharge Appointment Date and Location:

## 2019-02-03 NOTE — H&P ADULT - PROBLEM SELECTOR PLAN 9
Regular diet.  No need for IVF.  Replete vahe Regular diet.  No need for IVF.  Replete lytes    Admit to [facility].

## 2019-02-03 NOTE — DISCHARGE NOTE ADULT - PATIENT PORTAL LINK FT
You can access the [facility], offered by [facility], by registering with the following website: [email or web address]

## 2019-02-03 NOTE — H&P ADULT - ATTENDING COMMENTS
Pt. seen and examined by me earlier today; I have read Dr. Aldrich's H&P, I agree w/ his findings and plan of care as documented; Pt. s/p I&D by Plastics; cont. IV vanc, arm elevation, wound care per Plastics recs, f/u cultures; monitor for signs/sx of cocaine and benzo withdrawal

## 2019-02-03 NOTE — ED PROVIDER NOTE - SKIN, MLM
+ Dressing in place to left distal forearm and hand as placed by plastic surgery; exposed fingers without erythema or swelling.  Cap refill to left hand <5 sec..  Dressing appears clean, dry and intact.

## 2019-02-03 NOTE — H&P ADULT - NSHPLABSRESULTS_GEN_ALL_CORE
LABS:                         12.2   9.9   )-----------( 346      ( 2019 08:17 )             35.8         137  |  102  |  7   ----------------------------<  111<H>  4.1   |  20<L>  |  0.83    Ca    9.0      2019 19:45    TPro  7.3  /  Alb  3.8  /  TBili  0.4  /  DBili  x   /  AST  25  /  ALT  14  /  AlkPhos  68        Urinalysis Basic - ( 2019 20:16 )    Color: Yellow / Appearance: Clear / S.020 / pH: x  Gluc: x / Ketone: >=80 mg/dL  / Bili: Moderate / Urobili: 0.2 E.U./dL   Blood: x / Protein: Trace mg/dL / Nitrite: NEGATIVE   Leuk Esterase: NEGATIVE / RBC: < 5 /HPF / WBC < 5 /HPF   Sq Epi: x / Non Sq Epi: 0-5 /HPF / Bacteria: Present /HPF      CARDIAC MARKERS ( 2019 19:45 )  x     / <0.01 ng/mL / see note U/L / x     / x      Lactate, Blood: 1.0 mmoL/L ( @ 08:17)    RADIOLOGY, EKG & ADDITIONAL TESTS: Reviewed.

## 2019-02-03 NOTE — H&P ADULT - ASSESSMENT
34M PMHx IVDU- heroin, cocaine, benzos (was sober for 15 years then recently relapsed 8 days ago), presents with left hand swelling/pain x2 days, found to have purulent cellulitis and abscesses of LUE.

## 2019-02-03 NOTE — H&P ADULT - PROBLEM SELECTOR PLAN 1
JOAN cellulitis, 2/2 IVDU. Seen by Plastics, got I&D yesterday. Will continue on IB abx for now. Awaiting further recs from plastic surgery.   Not meeting SIRS criteria.   Pain controlled, continue with Tylenol for pain.   Follow up wound cultures from I&D.   Follow up blood cultures.   Continue Vancomycin, obtain trough.

## 2019-02-03 NOTE — H&P ADULT - PROBLEM SELECTOR PLAN 2
Long standing history of IVDU, had been sober 15 years, recent relapse.   Social work consulted  Will obtain HIV HVC Hepatitis labs.

## 2019-02-03 NOTE — ED ADULT NURSE NOTE - NSIMPLEMENTINTERV_GEN_ALL_ED
Implemented All Universal Safety Interventions:  [place] to call system. Call bell, personal items and telephone within reach. Instruct patient to call for assistance. Room bathroom lighting operational. Non-slip footwear when patient is off stretcher. Physically safe environment: no spills, clutter or unnecessary equipment. Stretcher in lowest position, wheels locked, appropriate side rails in place.

## 2019-02-03 NOTE — H&P ADULT - NSHPPHYSICALEXAM_GEN_ALL_CORE
Vital Signs Last 24 Hrs  T(C): 36.9 (03 Feb 2019 05:50), Max: 37.4 (03 Feb 2019 01:12)  T(F): 98.4 (03 Feb 2019 05:50), Max: 99.4 (03 Feb 2019 01:12)  HR: 110 (03 Feb 2019 05:50) (103 - 123)  BP: 178/93 (03 Feb 2019 05:50) (133/78 - 178/93)  BP(mean): --  RR: 18 (03 Feb 2019 05:50) (17 - 18)  SpO2: 97% (03 Feb 2019 05:50) (97% - 100%)    Constitutional: well-appearing, well-developed, NAD  Eyes: mildly dilated pupils, b/l reactive to light, EOMI, no nystagmus  ENMT: MMM, no oral lesions  Neck: supple  Respiratory: CTAb/l, no wheezes/rales/rhonchi  Cardiovascular: regular, tachycardic to 100s, normal S1/S2, no murmurs/rubs/gallops  Gastrointestinal: soft, NTND, BS normal  Extremities: b/l LUE and RUE track marks with points of swelling, 2 lesions on right forearm with tenderness to palpation (superior one with small amount of purulent drainage and inferior one with necrotic tissue and tenderness/fluctuance to palpation). Left arm wrapped/packed with some bloody drainage. No edema or cyanosis. Sensation intact b/l. No joint swelling  Vascular: radial and DP 2+ b/l  Neurological: AAOx3, CNII-XII grossly intact, moving all extremities  Musculoskeletal: no joint swelling  Skin: no rashes  Psych: normal affect

## 2019-02-03 NOTE — H&P ADULT - HISTORY OF PRESENT ILLNESS
Patient was admitted earlier this morning and left AMA due to personal reasons. History is obtained from patient and previous chart.     34/M PMHx of IVDU (heroin, benzo, cocaine) in which he was sober for 15 years, but recently relapsed.  presents with left hand swelling/pain x2 days. Pt is an IVDU and has been injecting cocaine, about 10g daily for the past 8 days. He injects only in his arms. He noticed pain and swelling in his left hand and thus came to [facility] yesterday. He was supposed to be admitted for antibiotics and I&D, but left AMA. Today he presents to [facility] with the above complaints. He denies fever or chills, nausea/vomiting, chest pain/shortness of breath, abdominal pain, constipation, dysuria. He has mild diarrhea associated with cocaine use. He lasted used cocaine earlier today and used benzos afterwards. He has been taking 5-14 pills of benzos at a time, unclear which one. He denies withdrawal in the past. He previously abused heroin more and last used it 8 days ago twice. He uses his own needs, negative for HIV in the past. Patient was admitted earlier this morning and left AMA due to personal reasons. History is obtained from patient and previous chart.     34/M PMHx of IVDU (heroin, benzo, cocaine) in which he was sober for 15 years, but recently relapsed. He comes in with 2 days of left hand swelling, pain, redness, after injection. He initially presented to [facility] but left AMA from there yesterday, and subsequently left AMA from [facility] just a few hours ago.   He presents again with the same complaints, but says that his hand does feel better after antibiotics. He lft overnight to leave a key for his . At this time he denies fever, chills, nausea/vomiting, chest pain/shortness of breath, abdominal pain, constipation, dysuria.   He says that his diarrhea has ceased, which he had related to cocaine use.   He denies withdrawal in the past. He previously abused heroin more and last used it 8 days ago twice.     ICU Vital Signs Last 24 Hrs  T(C): 36.9 (03 Feb 2019 05:50), Max: 37.4 (03 Feb 2019 01:12)  T(F): 98.4 (03 Feb 2019 05:50), Max: 99.4 (03 Feb 2019 01:12)  HR: 110 (03 Feb 2019 05:50) (103 - 123)  BP: 178/93 (03 Feb 2019 05:50) (133/78 - 178/93)  BP(mean): --  ABP: --  ABP(mean): --  RR: 18 (03 Feb 2019 05:50) (17 - 18)  SpO2: 97% (03 Feb 2019 05:50) (97% - 100%)

## 2019-02-03 NOTE — H&P ADULT - NSHPLABSRESULTS_GEN_ALL_CORE
LABS:                        12.4   9.1   )-----------( 285      ( 2019 19:45 )             36.9     -    137  |  102  |  7   ----------------------------<  111<H>  4.1   |  20<L>  |  0.83    Ca    9.0      2019 19:45    TPro  7.3  /  Alb  3.8  /  TBili  0.4  /  DBili  x   /  AST  25  /  ALT  14  /  AlkPhos  68        Urinalysis Basic - ( 2019 20:16 )    Color: Yellow / Appearance: Clear / S.020 / pH: x  Gluc: x / Ketone: >=80 mg/dL  / Bili: Moderate / Urobili: 0.2 E.U./dL   Blood: x / Protein: Trace mg/dL / Nitrite: NEGATIVE   Leuk Esterase: NEGATIVE / RBC: < 5 /HPF / WBC < 5 /HPF   Sq Epi: x / Non Sq Epi: 0-5 /HPF / Bacteria: Present /HPF        RADIOLOGY & ADDITIONAL TESTS:  EKG sinus tachycardia with non-specific ST changes- TWI II/AVF, V5/V6

## 2019-02-03 NOTE — DISCHARGE NOTE ADULT - HOSPITAL COURSE
34M PMHx IVDU- heroin, cocaine, benzos (was sober for 15 years then recently relapsed 8 days ago), presents with left hand swelling/pain x2 days, found to have purulent cellulitis and abscesses of LUE. s/p I&D of left abscesses by Plastic Surgery. s/p vanc/zosyn. Admitted to [facility] for further antibiotics. Patient left AMA to bring dog in the house. Explained risks of leaving AMA, stated he will come back through the ED after he lets his dog in. He refused to sign paperwork. Attending made aware.

## 2019-02-03 NOTE — H&P ADULT - NSHPSOCIALHISTORY_GEN_ALL_CORE
Lives by himself. Works in the healthcare field, owns his own business. Uses IV cocaine, benzos and sometimes heroin. Denies smoking or alcohol use.

## 2019-02-03 NOTE — ED PROVIDER NOTE - MUSCULOSKELETAL, MLM
Spine appears normal, range of motion is not limited throughout affected LUE, no muscle or joint tenderness

## 2019-02-03 NOTE — ED PROVIDER NOTE - OBJECTIVE STATEMENT
34 year old male presents to ED with concern for multiple abscesses to left hand which were previously incised, drained and packed by plastic surgery team yesterday prior to his signing out AMA following recommendation for admission.  Patient states he had to let his  in his home, so he left and came right back.  Patient states he relapsed after being clean for many years of his IVDA 8 days ago.  He notes significant improvement in his swelling and pain following initial I&D and abx dose.  No fever, chills, paresthesias into extremity, n/v, SOB, CP or additional acute complaints or concerns are noted at this time.

## 2019-02-03 NOTE — H&P ADULT - PROBLEM SELECTOR PLAN 1
2/2 IVDU. Purulent cellulitis. No signs of   - Vancomycin 1750mg q12hrs for MRSA coverage  - Plastic Surgery following, s/p I&D  - f/u blood cultures 2/2 IVDU. Purulent cellulitis. Not meeting SIRS criteria. No signs of compartment syndrome. s/p I&D by Plastic Surgery.  - Vancomycin 1750mg q12hrs for MRSA coverage  - Plastic Surgery following, s/p I&D of JOAN will return in am. Also has 2 ulcers with likely underlying abscesses of the Right forearm- may need drainage by Plastics in am.  - pain currently controlled without medications  - f/u gram stain/abscess cultures  - f/u blood cultures 2/2 IVDU. Purulent cellulitis. Not meeting SIRS criteria. No signs of compartment syndrome. Lactate 1.1. s/p I&D by Plastic Surgery.  - Vancomycin 1750mg q12hrs for MRSA coverage  - Plastic Surgery following, s/p I&D of LUE will return in am. Also has 2 ulcers with likely underlying abscesses of the Right forearm- may need drainage by Plastics in am.  - pain currently controlled without medications  - f/u gram stain/abscess cultures  - f/u blood cultures 2/2 IVDU. Purulent cellulitis. Not meeting SIRS criteria. No signs of compartment syndrome. Lactate 1.1. s/p I&D by Plastic Surgery.  - Vancomycin 1750mg q12hrs for MRSA coverage  - Plastic Surgery following, s/p I&D of LUE will return in am. Also has 2 ulcers with likely underlying abscesses of the Right forearm- may need drainage by Plastics in am.  - pain currently controlled without medications  - f/u gram stain/abscess cultures  - f/u blood cultures  -Vanc trough

## 2019-02-03 NOTE — ED PROVIDER NOTE - MEDICAL DECISION MAKING DETAILS
Pt returns to ED for admission with IV abx following his AMA signout early this morning.  Dressing in place by plastic surgery team is clean, dry and intact.  Full mobility of fingers to affected LUE without difficulty.  Cap refill <5s, sensation intact distally.  Next dose of vanc is not due until 10am.  Will repeat labs, give IVF bolus and admit.  Patient is aware of plan and agreeable.

## 2019-02-03 NOTE — H&P ADULT - HISTORY OF PRESENT ILLNESS
HPI: 34M PMHx IVDU- heroin, cocaine, benzos (was sober for 15 years then recently relapsed 8 days ago), presents with left hand swelling/pain x2 days. HPI: 34M PMHx IVDU- heroin, cocaine, benzos (was sober for 15 years then recently relapsed 8 days ago), presents with left hand swelling/pain x2 days. Pt is an IVDU and has been injecting cocaine, about 10g daily for the past 8 days. He injects only in his arms. He noticed pain and swelling in his left hand and thus came to [facility] yesterday. He was supposed to be admitted for antibiotics and I&D, but left AMA. Today he presents to [facility] with the above complaints. He denies fever or chills, nausea/vomiting, chest pain/shortness of breath, abdominal pain, constipation, dysuria. He has mild diarrhea associated with cocaine use. He lasted used cocaine earlier today and used benzos afterwards. He has been taking 5-14 pills of benzos at a time, unclear which one. He denies withdrawal in the past. He previously abused heroin more and last used it 8 days ago twice. He uses his own needs, negative for HIV in the past.    ED vitals:  T(F): 99.4, Max: 99.4  HR: 103 (03 Feb 2019 01:12) (103 - 123)  BP: 133/78 (133/78 - 156/93)  RR: 17 (17 - 18)  SpO2: 99% (98% - 100%)    s/p I&D of left hand and forearm abscesses. s/p vancomycin/zosyn, Tylenol, Dilaudid 1mg IV x1, 1L NS

## 2019-02-03 NOTE — PROGRESS NOTE ADULT - ASSESSMENT
Assessment/Plan:  Patient is a 34y old  Male who presents with a chief complaint of L ARM CELLULITIS (03 Feb 2019 07:51)  - dressing changed today, begin NS irrigations of the areas  - Continue abx  - follow up cultures

## 2019-02-03 NOTE — ED ADULT TRIAGE NOTE - CHIEF COMPLAINT QUOTE
pt signed AMA after surgery to lt arm due to abscess this morning at 3 am .Pt went home to take care of his dog. pt was advised to stay for antibiotic therapy.

## 2019-02-03 NOTE — PATIENT PROFILE ADULT - FUNCTIONAL SCREEN CURRENT LEVEL: EATING, MLM
After Visit Summary   9/27/2018    Toby Webb    MRN: [id number]           Patient Information     Date Of Birth          1941        Visit Information        Provider Department      9/27/2018 10:50 AM Myriam Walker PT [facility]        Today's Diagnoses     Neck pain on left side    -  1       Follow-ups after your visit        Your next 10 appointments already scheduled     Oct 04, 2018 10:50 AM CDT   FRED Spine with Myriam Walker PT   [facility] ([facility] [place]  )    [address]  [facility] [address]   [phone]            Oct 11, 2018 10:50 AM CDT   FRED Spine with Myriam Walker PT   [facility] ([facility]  )    [address]  [facility] [address]   [phone]            Oct 18, 2018 10:50 AM CDT   FRED Spine with Myriam Walker PT   [facility] ([facility]  )    [address]  [facility] [address]   [phone]            Feb 27, 2019 11:30 AM CST   Return Sleep Patient with Grant Oneill MD   [facility] ([facility])    [address] [place] [id number] [phone]              Who to contact     If you have questions or need follow up information about today's clinic visit or your schedule please contact [facility] directly at [phone].  Normal or non-critical lab and imaging results will be communicated to you by MyChart, letter or phone within 4 business days after the clinic has received the results. If you do not hear from us within 7 days, please contact the clinic through MyChart or phone. If you have a critical or abnormal lab result, we will notify you by phone as soon as  possible.  Submit refill requests through [employer] or call your pharmacy and they will forward the refill request to us. Please allow 3 business days for your refill to be completed.          Additional Information About Your Visit        [employer] Information     [employer] gives you secure access to your electronic health record. If you see a primary care provider, you can also send messages to your care team and make appointments. If you have questions, please call your primary care clinic.  If you do not have a primary care provider, please call [phone] and they will assist you.        Care EveryWhere ID     This is your Care EveryWhere ID. This could be used by other organizations to access your [place] medical records  [id number]         Blood Pressure from Last 3 Encounters:   09/24/18 197/88   03/19/18 130/70   02/27/18 152/72    Weight from Last 3 Encounters:   09/24/18 (!) 156.9 kg (345 lb 14.4 oz)   03/19/18 (!) 159.4 kg (351 lb 6.4 oz)   02/27/18 (!) 156.9 kg (346 lb)              We Performed the Following     HC PT EVAL, LOW COMPLEXITY     [facility]ED INITIAL EVAL REPORT     [facility] PT, HAND, AND CHIROPRACTIC REFERRAL     THERAPEUTIC EXERCISES        Primary Care Provider Office Phone # Fax #    Rashawn Wing Delgado [phone]       [address] [facility] [address]        Equal Access to Services     BRENNAN AUSTIN AH: Hadii aad ku hadasho Soomaali, waaxda luqadaha, qaybta kaalmada adeegyada, cameron rodríguez . So [facility] [phone].    ATENCIÓN: Si habla español, tiene a vitale disposición servicios gratuitos de asistencia lingüística. Llame al [phone].    We comply with applicable federal civil rights laws and [place] laws. We do not discriminate on the basis of race, color, national origin, age, disability, sex, sexual orientation, or gender identity.            Thank you!     Thank you for choosing [facility]   for your care. Our goal is always to provide you with excellent care. Hearing back from our patients is one way we can continue to improve our services. Please take a few minutes to complete the written survey that you may receive in the mail after your visit with us. Thank you!             Your Updated Medication List - Protect others around you: Learn how to safely use, store and throw away your medicines at www.disposemymeds.org.          This list is accurate as of 9/27/18  2:53 PM.  Always use your most recent med list.                   Brand Name Dispense Instructions for use Diagnosis    acetaminophen 325 MG tablet    TYLENOL    100 tablet    Take 3 tablets (975 mg) by mouth every 6 hours    Status post total right knee replacement       aclidinium 400 MCG/ACT inhaler    TUDORZA PRESSAIR    3 Inhaler    Inhale 1 puff into the lungs every 12 hours    Chronic obstructive pulmonary disease, unspecified COPD type (H)       aspirin 81 MG EC tablet      Take 81 mg by mouth daily.        blood glucose monitoring test strip    no brand specified    1 Box    by In Vitro route daily    Type 2 diabetes mellitus without complications (H)       carvedilol 25 MG tablet    COREG    180 tablet    TAKE ONE TABLET BY MOUTH TWICE A DAY WITH MEALS    Essential hypertension, benign, ASCVD (arteriosclerotic cardiovascular disease)       colchicine 0.6 MG tablet    COLCRYS    30 tablet    Take  by mouth. Take 2 tablets at the first sign of flare, take 1 additional tablet one hour later. Maximum of 6-8 daily    Gout, unspecified       escitalopram 20 MG tablet    LEXAPRO    90 tablet    Take 1 tablet (20 mg) by mouth every morning    Major depressive disorder, recurrent episode, mild (H)       gabapentin 600 MG tablet    NEURONTIN    360 tablet    TAKE 1 TABLET BY MOUTH IN THE MORNING, 1 TABLET MID-DAY, AND 2 TABLETS AT BEDTIME    Peripheral polyneuropathy, Spinal stenosis, lumbar region, with neurogenic claudication        HYDROcodone-acetaminophen 5-325 MG per tablet    NORCO    10 tablet    Take 1 tablet by mouth every 6 hours as needed for severe pain    Cervicalgia       lisinopril 40 MG tablet    PRINIVIL/ZESTRIL    90 tablet    Take 1 tablet (40 mg) by mouth daily    Essential hypertension, benign, Ischemic cardiomyopathy       methocarbamol 750 MG tablet    ROBAXIN    28 tablet    Take 1 tablet (750 mg) by mouth 4 times daily as needed for muscle spasms    Cervicalgia       Multi-Lancet Device Misc     1 each    1 Device daily.    Impaired fasting glucose       MULTIPLE VITAMIN PO      1 tablet by mouth daily        PROAIR  (90 Base) MCG/ACT inhaler   Generic drug:  albuterol     8.5 g    INHALE TWO PUFFS BY MOUTH EVERY 4 HOURS AS NEEDED FOR SHORTNESS OF BREATH    Chronic obstructive pulmonary disease, unspecified COPD type (H)       probenecid 500 MG tablet    BENEMID    180 tablet    Take 1 tablet (500 mg) by mouth 2 times daily (with meals)    Gout of multiple sites, unspecified cause, unspecified chronicity       simvastatin 40 MG tablet    ZOCOR    90 tablet    Take 1 tablet (40 mg) by mouth At Bedtime    Hyperlipidemia LDL goal <100       spironolactone 25 MG tablet    ALDACTONE    90 tablet    Take 1 tablet (25 mg) by mouth daily    Diastolic congestive heart failure, unspecified congestive heart failure chronicity (H)       venlafaxine 37.5 MG 24 hr capsule    EFFEXOR XR     Take 1 capsule (37.5 mg) by mouth daily    Major depressive disorder, recurrent episode, mild (H)          0 = independent

## 2019-02-04 LAB
GRAM STN FLD: SIGNIFICANT CHANGE UP
SPECIMEN SOURCE: SIGNIFICANT CHANGE UP

## 2019-02-04 NOTE — PROVIDER CONTACT NOTE (OTHER) - SITUATION
Upon hourly rounds, pt found in bathroom for extensive period of time. Syringe and pills found in bed. Security, MD, and supervisors called. Pt alert and oriented x4. Left AMA. Escorted by security.

## 2019-02-04 NOTE — DISCHARGE NOTE ADULT - HOSPITAL COURSE
34/M PMHx of IVDU (heroin, benzo, cocaine) in which he was sober for 15 years, but recently relapsed. He comes in with 2 days of left hand swelling, pain, redness, after injection. He initially presented to [facility] but left AMA from there on 2/1/19, and subsequently left AMA from [facility] after being admitted on 2/2/19. Pt returned on 2/3/19 with the same complaints, but says that his hand does feel better after antibiotics. Admitted again and started on IV vancomycin again. During thr early AM of 2/4/19, patient found locked in his bathroom and eventually admitting to cocaine use at that time. After telling patient that he would have to have his possessions kept with security throughout the rest of the admission, patient began to take out his IV and said that he would leave AMA. As in chart note, explained the risks of leaving the hospital to the patient. Pt insisted on leaving AMA, had IV removed, and was escorted out by security.

## 2019-02-04 NOTE — PROVIDER CONTACT NOTE (OTHER) - ASSESSMENT
Pt alert and oriented x4. Calm and cooperative. Able to understand and respond to medical advice. Admitted to usage of cocaine in bathroom. Syringes with needles found in bathroom trash bin.

## 2019-02-04 NOTE — CHART NOTE - NSCHARTNOTEFT_GEN_A_CORE
I was called around 3:20 AM that this patient had locked himself in his bathroom and would not come out I was called around 3:20 AM that this patient had locked himself in his bathroom and would not come out. Patient's nurse had searched patient's bed at this time as well, finding multiple pills and a needle syringe. Security called to pt's room. The patient was eventually convinced to come out of the bathroom. Pt admits that he just used cocaine in the bathroom. I explained to the patient that behavior of this kind is not tolerated in the hospital as it is not safe for the patient or anyone else involved in the patient's care. Also told the patient that because of what had just occurred, security would have to take all of his belongings and store them until the patient is discharged, at which time all of his belongings would be returned to him. The patient was not happy about this statement and said that if that were the case, he would have to "leave and go elsewhere in the city for treatment". Patient went on to explain that "it is too easy to get drugs in the hospital" and that he must "go to a locked-up detox facility." I explained to the patient that this would be a great plan for after he had completed treated with IV antibiotics for his purulent LUE cellulitis. Patient not receptive to this statement and continued to insist on leaving. I and Dr. Brumfield explained to the patient the risks of leaving the hospital without proper antibiotic treatment, including worsening of his cellulitis infection and even death. Patient expressed understanding but still insisted on leaving the hospital at that time saying that he believed he could find the treatment that he needed "at one of the other hospitals in [facility]." Patient's IV removed and patient left AMA. I was called around 3:20 AM that this patient had locked himself in his bathroom and would not come out. Patient's nurse had searched patient's bed at this time as well, finding multiple pills and a needle syringe. Security called to pt's room. The patient was eventually convinced to come out of the bathroom. Pt admits that he just used cocaine in the bathroom. I explained to the patient that behavior of this kind is not tolerated in the hospital as it is not safe for the patient or anyone else involved in the patient's care. Also told the patient that because of what had just occurred, security would have to take all of his belongings and store them until the patient is discharged, at which time all of his belongings would be returned to him. The patient was not happy about this statement and said that if that were the case, he would have to "leave and go elsewhere in the city for treatment". Patient went on to explain that "it is too easy to get drugs in the hospital" and that he must "go to a locked-up detox facility." I explained to the patient that this would be a great plan for after he had completed treated with IV antibiotics for his purulent LUE cellulitis. Patient not receptive to this statement and continued to insist on leaving. I and Dr. Brumfield explained to the patient the risks of leaving the hospital without proper antibiotic treatment, including worsening of his cellulitis infection and even death. Patient expressed understanding but still insisted on leaving the hospital at that time saying that he believed he could find the treatment that he needed "at one of the other hospitals in [facility]." Patient's IV removed and patient left AMA, escorted out by security.

## 2019-02-04 NOTE — PROVIDER CONTACT NOTE (OTHER) - ACTION/TREATMENT ORDERED:
Patient assessed and spoken to. Continuation of treatment refused. Patient escorted out by security AMA.

## 2019-02-04 NOTE — DISCHARGE NOTE ADULT - PLAN OF CARE
Treat infection to completion Pt found to have LUE cellulitis. Got I & D from plastic surgery on 2/2. Was on vancomycin to treat cellulitis and was receiving Tylenol for pain control. Patient left the hospital AMA so was not prescribed any outpatient antibiotic regimen. Patient with longstadning history of IVDU with recent relapse. As noted in chart note, found to have pills and syringes in bed and admitted to have done at cocaine in the restroom of his hospital room. Patient left AMA. Encouraged to get IV antibiotics and eventually go to detox treatment center.

## 2019-02-04 NOTE — DISCHARGE NOTE ADULT - CARE PLAN
Principal Discharge DX:	Cellulitis and abscess  Goal:	Treat infection to completion  Assessment and plan of treatment:	Pt found to have LUE cellulitis. Got I & D from plastic surgery on 2/2. Was on vancomycin to treat cellulitis and was receiving Tylenol for pain control. Patient left the hospital AMA so was not prescribed any outpatient antibiotic regimen.  Secondary Diagnosis:	IV drug abuse  Assessment and plan of treatment:	Patient with longstadning history of IVDU with recent relapse. As noted in chart note, found to have pills and syringes in bed and admitted to have done at cocaine in the restroom of his hospital room. Patient left AMA. Encouraged to get IV antibiotics and eventually go to detox treatment center.

## 2019-02-04 NOTE — DISCHARGE NOTE ADULT - PATIENT PORTAL LINK FT
You can access the [facility] Patient Portal, offered by [facility], by registering with the following website: [email or web address]

## 2019-02-05 DIAGNOSIS — Z79.899 OTHER LONG TERM (CURRENT) DRUG THERAPY: ICD-10-CM

## 2019-02-05 DIAGNOSIS — L03.114 CELLULITIS OF LEFT UPPER LIMB: ICD-10-CM

## 2019-02-05 DIAGNOSIS — Z88.1 ALLERGY STATUS TO OTHER ANTIBIOTIC AGENTS STATUS: ICD-10-CM

## 2019-02-05 DIAGNOSIS — Z79.2 LONG TERM (CURRENT) USE OF ANTIBIOTICS: ICD-10-CM

## 2019-02-05 DIAGNOSIS — M79.641 PAIN IN RIGHT HAND: ICD-10-CM

## 2019-02-05 DIAGNOSIS — L03.113 CELLULITIS OF RIGHT UPPER LIMB: ICD-10-CM

## 2019-02-05 LAB
CULTURE RESULTS: NO GROWTH — SIGNIFICANT CHANGE UP
SPECIMEN SOURCE: SIGNIFICANT CHANGE UP

## 2019-02-06 DIAGNOSIS — F13.10 SEDATIVE, HYPNOTIC OR ANXIOLYTIC ABUSE, UNCOMPLICATED: ICD-10-CM

## 2019-02-06 DIAGNOSIS — Z88.3 ALLERGY STATUS TO OTHER ANTI-INFECTIVE AGENTS: ICD-10-CM

## 2019-02-06 DIAGNOSIS — Z91.19 PATIENT'S NONCOMPLIANCE WITH OTHER MEDICAL TREATMENT AND REGIMEN: ICD-10-CM

## 2019-02-06 DIAGNOSIS — L03.114 CELLULITIS OF LEFT UPPER LIMB: ICD-10-CM

## 2019-02-06 DIAGNOSIS — L02.414 CUTANEOUS ABSCESS OF LEFT UPPER LIMB: ICD-10-CM

## 2019-02-06 DIAGNOSIS — F13.19 SEDATIVE, HYPNOTIC OR ANXIOLYTIC ABUSE WITH UNSPECIFIED SEDATIVE, HYPNOTIC OR ANXIOLYTIC-INDUCED DISORDER: ICD-10-CM

## 2019-02-06 DIAGNOSIS — F11.10 OPIOID ABUSE, UNCOMPLICATED: ICD-10-CM

## 2019-02-06 DIAGNOSIS — L02.512 CUTANEOUS ABSCESS OF LEFT HAND: ICD-10-CM

## 2019-02-06 DIAGNOSIS — F14.19 COCAINE ABUSE WITH UNSPECIFIED COCAINE-INDUCED DISORDER: ICD-10-CM

## 2019-02-06 DIAGNOSIS — D50.9 IRON DEFICIENCY ANEMIA, UNSPECIFIED: ICD-10-CM

## 2019-02-06 DIAGNOSIS — F14.10 COCAINE ABUSE, UNCOMPLICATED: ICD-10-CM

## 2019-02-06 LAB
CULTURE RESULTS: SIGNIFICANT CHANGE UP
SPECIMEN SOURCE: SIGNIFICANT CHANGE UP

## 2019-07-25 NOTE — ED ADULT NURSE REASSESSMENT NOTE - CONDITION
Problem: Altered Mood, Depressive Behavior:  Goal: Able to verbalize and/or display a decrease in depressive symptoms  Description  Able to verbalize and/or display a decrease in depressive symptoms  7/25/2019 1039 by Francesco Delaney RN  Outcome: Ongoing     Problem: Altered Mood, Depressive Behavior:  Goal: Ability to disclose and discuss suicidal ideas will improve  Description  Ability to disclose and discuss suicidal ideas will improve  7/25/2019 1039 by Francesco Delaney RN  Outcome: Ongoing     Problem: Suicide risk  Goal: Provide patient with safe environment  Description  Provide patient with safe environment  Outcome: Ongoing   Patient is calm, controlled and medication compliant. Patient denies suicidal ideations but reports feelings of depression and anxiety. Patient appears flat and is isolative to self/room. Patient reports eating and sleeping adequately with safety checks Q15min and at irregular intervals; patient safety is maintained. unchanged

## 2019-10-24 NOTE — OCCUPATIONAL THERAPY INITIAL EVALUATION ADULT - LEVEL OF INDEPENDENCE: SUPINE/SIT, REHAB EVAL
Problem: Goal Outcome Summary  Goal: Goal Outcome Summary  Outcome: No Change  Pt. admitted from L&D  via wheelchair and transferred to bed with SBA. Pt. arrived with baby and was accompanied by  and arrived with personal belongings. Report was taken from Lydia in L&D. VSS. Pain medications given in L&D. Fundus is firm and midline.  Vaginal bleeding is small.  SL in place.  Pt. oriented to the room and call light system. Encouraged to call with questions.        independent Negative

## 2019-11-21 NOTE — ED PROVIDER NOTE - MUSCULOSKELETAL MINIMAL EXAM
-- DO NOT REPLY / DO NOT REPLY ALL --  -- Message is from the [facility]--    Reason for Call: A order was sent to [facility] for a breast ultrasound but the order is missing the code which is Z12.39, please update and resend. Patient also requesting the ophthalmology referral to be faxed to Dr Robert Branham' office. Fax;[phone] Please call back once completed.     Caller Information       Type Contact Phone    11/21/2019 01:23 PM Phone (Incoming) Kerry Singh H (Self) [phone] (H)          Alternative phone number:      Turnaround time given to caller:   \"This message will be sent to [state Provider's name]. The clinical team will fulfill your request as soon as they review your message.\"     atraumatic

## 2019-11-27 NOTE — DISCHARGE NOTE ADULT - PHYSICIAN SECTION COMPLETE
----- Message from Ralph Mueller Jr., DO sent at 11/27/2019  9:10 AM EST -----  Degenerative changes seen throughout lumbar spine but there are specific areas of concern. At L4-5 there is a disc bulge with facet arthritis causing severe central canal stenosis which I believe is contributing to his presenting symptoms of falls an  d pain. I am recommending neurosurgical consult.  
Pt informed of results  
Yes

## 2020-01-13 NOTE — ED ADULT NURSE NOTE - NS ED PATIENT SAFETY CONCERN
"  Occupational Therapy Daily Treatment Note     Date: 1/13/2020  Name: Channing Cooper  Clinic Number: [id number]    DANILO Dwyer OTR/L, CHT    No

## 2020-03-10 NOTE — ED ADULT NURSE NOTE - NS PRO PASSIVE SMOKE EXP
No
74 y/o female with colon stricture, colon obstruction. Pt with PMH of diverticulitis, S/P colon resection with colostomy (reversed). Complain of abdominal discomfort, diarrhea, constipation. Pt reports she had barium enema which showed colon stricture and obstruction. Here today for PST for planned open low anterior resection, repair of ventral hernia.

## 2021-12-20 NOTE — ED ADULT NURSE NOTE - CAS ED AMA FORM SIGNED YN
"  Assessment & Plan   14 yo female here for contraceptive management, poor compliance on ocp   - wants to try birth control patch   - will do quick start method, if uchg negative can apply patch today   - uhcg neg   - defers std testing      Follow Up  No follow-ups on file.  If not improving or if worsening    Kayla Melara MD        Nelly Khan is a 15 year old who presents for the following health issues  accompanied by her mother.    HPI     Concerns: Patient would like to discuss birth control options.      ALETHEAREZA Whatley  Was started on ocp due to being sexually active but patient isn't compliant, now would like to birth control patch instead    Review of Systems   Constitutional, eye, ENT, skin, respiratory, cardiac, and GI are normal except as otherwise noted.      Objective    /72   Pulse 72   Temp 97.2  F (36.2  C) (Tympanic)   Ht 5' 2.25\" (1.581 m)   Wt 162 lb (73.5 kg)   SpO2 99%   BMI 29.39 kg/m    91 %ile (Z= 1.36) based on CDC (Girls, 2-20 Years) weight-for-age data using vitals from 12/20/2021.  Blood pressure reading is in the normal blood pressure range based on the 2017 AAP Clinical Practice Guideline.    Physical Exam   GENERAL: Active, alert, in no acute distress.  SKIN: Clear. No significant rash, abnormal pigmentation or lesions  LUNGS: Clear. No rales, rhonchi, wheezing or retractions  HEART: Regular rhythm. Normal S1/S2. No murmurs.  ABDOMEN: Soft, non-tender, not distended, no masses or hepatosplenomegaly. Bowel sounds normal.             " Yes

## 2022-05-31 NOTE — H&P ADULT - PROBLEM SELECTOR PLAN 8
IMPROVE 0. Topical Ketoconazole Counseling: Patient counseled that this medication may cause skin irritation or allergic reactions.  In the event of skin irritation, the patient was advised to reduce the amount of the drug applied or use it less frequently.   The patient verbalized understanding of the proper use and possible adverse effects of ketoconazole.  All of the patient's questions and concerns were addressed.

## 2022-07-13 NOTE — ED PROVIDER NOTE - DISPOSITION TYPE
Chief Complaint   Patient presents with   • Office Visit   • Foot Ulcer     Follow up diabetic ulcer, right great toe; he states it was throbbing on Saturday, but hasn't been since then; some discharge on bandaid today, no bleeding; using bandaid to keep it from rubbing  Last seen 6-09-22       S: The 80 year old male patient presents today with complaints of an ulceration. The patient has had an ulcer at the dorsal right hallux for 6 weeks. This has been treating with iodone.  The patient reports it was getting smaller, but on Monday he noticed increased pain and redness of the hallux he denies drainage from the area.    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  Itching, face puffiness. NO hives or respiratory distress. Will need pre medication prior to Contrasted studies.   • Lisinopril-Hctz THROAT SWELLING   • Atorvastatin MYALGIA     Needs brand Lipitor   • Ciprofloxacin Hcl RASH   • Clindamycin RASH     Rash/increased phlegm at trach site/fatigue/SOB/decreased urine output   • Crestor [Rosuvastatin Calcium] HEADACHES   • Humalog [Insulin Lispro (Human)] Other (See Comments)     Chest tightness   • Iodine Other (See Comments)     Pt reports allergy to iodine, unable to state what reaction patient has had   • Ipratropium Other (See Comments)     Other reaction(s): OTHER  (explain in comments)  Slowed urination, sore throat, upset stomach nausea. This was with Atrovent nasal spray.   • Lipitor [Atorvastatin Calcium] MYALGIA     Problem with generic.   • Metrizamide Other (See Comments)     Other reaction(s): UNKNOWN  Patient is unsure of reaction.  Rash included.   • Nifedipine INSOMNIA     And itching   • Norvasc [Amlodipine] Other (See Comments)     Urinary hesitancy    • Penicillins RASH   • Pitavastatin INSOMNIA   • Pravastatin INSOMNIA   • Simvastatin MYALGIA   • Sulfa Antibiotics WEAKNESS       O:   Derm:  There is an ulcer at the dorsal aspect of the right hallux.  It measures 4 mm in diameter 2 mm in depth . Upon debridement the base of the ulcer is beefy red.  There is minimal erythema.  It does not probe to bone and there is no tracking noted.  There are no signs of infection noted.  Vasc:  DP 1/4, PT 0/4, CFT is 3 seconds.  Stent placement in RLE 3/16/22  X-Ray: no bone erosion noted      A: Diabetic ulcer of right great toe (CMS/HCC)  (primary encounter diagnosis)  Chronic toe ulcer, right, with fat layer exposed (CMS/HCC)      P:   X-rays were ordered and reviewed  Under sterile conditions excisional debridement performed with a 15 blade of HPK, necrotic, and fibrous tissue surrounding and at the wound base.  This was performed to level of and including subcutaneous tissue(CPT 90460 ) to fresh bleeding tissue.   The area was bandaged with triple antibiotic ointment and a bandage.  The patient was educated on signs of infection and wound care and will No follow-ups on file. or sooner PRN.         Orders Placed This Encounter   • XR Foot 3+ View Standing Right   • zolpidem (Ambien) 5 MG tablet   • linezolid (ZYVOX) 600 MG tablet       Jacinda Espinosa, TRELL     ADMIT

## 2023-02-15 NOTE — ED ADULT NURSE NOTE - NSFALLRSKHARMRISK_ED_ALL_ED
Attempted to introduce self and role to patient and family. Patient and family state patient is pending to be discharged from the hospital, possibly tomorrow, with plans to follow up with outside oncologist for further chemotherapy and ongoing treatment. Daughter adamant that patient goals not aligned with our department, they will not be pursuing services from our department and they will reach out on their own if they are ever in need of our assistance in the future. Assured ongoing availability of our team in the event they request. No role for palliative identified as goals are aligned with ongoing disease modifying therapies. no

## 2023-03-22 NOTE — H&P ADULT - PROBLEM SELECTOR PROBLEM 3
Hyponatremia Benzodiazepine abuse Normal vision: sees adequately in most situations; can see medication labels, newsprint

## 2023-06-23 NOTE — DISCHARGE NOTE ADULT - IF YOU ARE A SMOKER, IT IS IMPORTANT FOR YOUR HEALTH TO STOP SMOKING. PLEASE BE AWARE THAT SECOND HAND SMOKE IS ALSO HARMFUL.
Surgeon (please enter first and last name): Dr. Rosado  Fax number for Preop Evaluation: [phone]  Location of Surgery: [facility]  Date of Surgery: 06-26-23  Procedure: Cataract  History of reaction to anesthesia?  No     Milagros Harris, EMT at 10:39 AM on 6/23/2023    Statement Selected

## 2024-01-08 NOTE — PROGRESS NOTE ADULT - PROBLEM/PLAN-9
DISPLAY PLAN FREE TEXT At this time ,pt is functioning in his roles, self sufficient, driving & ambulating independently in the community without any assistive devices. All are in good condition and easily accessible .Pt owns no DME . Pt c/o chronic 10/10 pain in his left knee The pain is exacerbated, by walking, prolonged standing, negotiating steps and is relieved with precocet. Pt is right hand dominant and wears glasses for reading. At this time ,pt is functioning in his roles, self sufficient, driving & ambulating independently in the community without any assistive devices. Pt has an antalgic gait .Pt owns no DME. Pt c/o chronic 10/10 pain in his left knee The pain is exacerbated, by walking, prolonged standing, negotiating steps and is relieved with percocet. Pt is right hand dominant and wears glasses for reading.

## 2024-01-16 NOTE — PATIENT PROFILE ADULT - PRIMARY ROLES/RESPONSIBILITIES
81-year-old male, history of cardiomyopathy, s/p pacemaker in 2009, glaucoma, CVA X2 1993,  HTN, DM type 2, CABG X4 1995, cardiac stent X1 1998,  prostate cancer tx with radioactive seeds 2004, colon cancer 2015 tx with colon resection, presenting to the ED today for epigastric pain radiating to the left shoulder and back since 3 AM last night.  Patient states that he called his cardiologist, who recommended coming to the emergency department. He is scheduled  to get his pacemaker replaced tomorrow.  He denies any alcohol use.  No smoking or other drug use.  Otherwise no fever, nausea, vomiting, diarrhea, constipation, headache, shortness of breath, or urinary symptoms.  Cardiologist is Dr. Alex Cruz.
wage earner, full time

## 2024-08-11 NOTE — ED ADULT TRIAGE NOTE - CHIEF COMPLAINT QUOTE
Patient with PMHx of IV drug use, relapsed five days ago with cocaine, last use one hour PTA, complaining of pain to wound in left hand.  Patient states site was of injection five days ago.  Patient denies any CP, SOB, dizziness, N/V, abdominal pain or any other complaints at this time.  EKG in progress.
PAST MEDICAL HISTORY:  GERD (gastroesophageal reflux disease)     HTN (hypertension)     Malignant melanoma left thigh    Spondylolisthesis at L5-S1 level s/p surgery 2018

## 2025-01-02 NOTE — ED ADULT TRIAGE NOTE - ESI TRIAGE ACUITY LEVEL, MLM
Pt ambulatory to treatment area c/o constant right sided rib pain x 1 week that is now radiating around under right breast. Pt denies SOB or fever. Pt reports that her entire family \"just got over a cold.\" Pt reports PMH PE last year but is no longer on Eliquis. Pt states pain feels \"similar but in a different area.\"   
3

## 2025-04-02 NOTE — ED ADULT TRIAGE NOTE - SOURCE OF INFORMATION
You were seen today in the emergency department for your cough.  We have evaluated you and determined that you likely have a viral infection.  We now feel you are safe for discharge home.    Please return to the emergency department immediately if develop any new or worsening concerns including chest pain, shortness of breath, abdominal pain, nausea, vomiting, diarrhea, weakness, loss consciousness, fever, chills, or any other concerns.    Please call your PCP and schedule appointment within the next 24 to 48 hours for follow-up.    
Patient

## 2025-05-01 NOTE — ED ADULT NURSE NOTE - NSFALLRSKUNASSIST_ED_ALL_ED
FM Progress Note    Subjective:   DM. No longer on trulicity. 70/30 42 and 20. Stopped farxiga 3/28/25 2/2 genital rash. Metformin.   Hemoglobin A1C   Date Value Ref Range Status   03/26/2025 9.9 % Final      Flatulence. On metformin.     L sinus congestion. No fever. Feels some ear fullness. No other URI sxs. Congestion seemed better controlled when taking both flonase AND astelin.     Epilepsy. Phenytoin. No Sz in years.    R hip pain. Chronic. Can shoot down R leg. Direct pressure posterior to R hip helps until releasing her hand. Getting a little better. Zanaflex prn.     H/o breast cancer. Regular mammo w/ Dr. Ugalde.       Health Maintenance Due   Topic Date Due    Hepatitis A vaccine (1 of 2 - Risk 2-dose series) Never done    Hepatitis B vaccine (1 of 3 - Risk 3-dose series) Never done    Diabetic retinal exam  09/15/2015    Diabetic foot exam  05/11/2024    Annual Wellness Visit (Medicare Advantage)  01/01/2025    Diabetic Alb to Cr ratio (uACR) test  03/20/2025    Lipids  03/20/2025    GFR test (Diabetes, CKD 3-4, OR last GFR 15-59)  03/20/2025    COVID-19 Vaccine (7 - 2024-25 season) 04/26/2025           Objective:   /73   Pulse 71   Temp 97.7 °F (36.5 °C) (Temporal)   General appearance: NAD, alert and interacting appropriately  HEENT: NCAT, PERRLA, EOMI   Resp: CTAB, no WRC  CVS: RRR, no MRG  Abdomen: BS +, SNDNT  Extremities: No clubbing, cyanosis, or edema. Warm. Dry.        I have reviewed this patient's previous records.    I have reviewed this patient's labs.    I have reviewed this patient's medications.      Assessment/Plan:    There are no diagnoses linked to this encounter.    Instructions:   Please increase morning 70/30 from 25 to 28 units.    Metformin can increase flatulence. If we get your sugars under better control with the insulin and the farxiga, we can start weaning the metformin.     Please call insurance and ask which of these is 
no